# Patient Record
Sex: FEMALE | Race: WHITE | Employment: UNEMPLOYED
[De-identification: names, ages, dates, MRNs, and addresses within clinical notes are randomized per-mention and may not be internally consistent; named-entity substitution may affect disease eponyms.]

---

## 2023-04-14 PROBLEM — F19.10 POLYSUBSTANCE ABUSE (HCC): Status: ACTIVE | Noted: 2023-04-14

## 2023-04-14 PROBLEM — N17.9 AKI (ACUTE KIDNEY INJURY) (HCC): Status: ACTIVE | Noted: 2023-04-14

## 2024-10-11 ENCOUNTER — APPOINTMENT (OUTPATIENT)
Facility: HOSPITAL | Age: 39
End: 2024-10-11
Payer: MEDICAID

## 2024-10-11 ENCOUNTER — HOSPITAL ENCOUNTER (INPATIENT)
Facility: HOSPITAL | Age: 39
LOS: 14 days | Discharge: HOME | End: 2024-10-25
Attending: STUDENT IN AN ORGANIZED HEALTH CARE EDUCATION/TRAINING PROGRAM | Admitting: FAMILY MEDICINE
Payer: MEDICAID

## 2024-10-11 DIAGNOSIS — R10.11 ABDOMINAL PAIN, RIGHT UPPER QUADRANT: Primary | ICD-10-CM

## 2024-10-11 DIAGNOSIS — F10.20 ALCOHOL USE DISORDER, SEVERE, DEPENDENCE (HCC): ICD-10-CM

## 2024-10-11 DIAGNOSIS — R17 JAUNDICE: ICD-10-CM

## 2024-10-11 DIAGNOSIS — F11.90 OPIOID USE DISORDER: ICD-10-CM

## 2024-10-11 DIAGNOSIS — D72.829 LEUKOCYTOSIS, UNSPECIFIED TYPE: ICD-10-CM

## 2024-10-11 DIAGNOSIS — G62.9 NEUROPATHY: ICD-10-CM

## 2024-10-11 LAB
ALBUMIN SERPL-MCNC: 3 G/DL (ref 3.5–5)
ALBUMIN/GLOB SERPL: 0.5 (ref 1.1–2.2)
ALP SERPL-CCNC: 599 U/L (ref 45–117)
ALT SERPL-CCNC: 20 U/L (ref 12–78)
ANION GAP SERPL CALC-SCNC: 10 MMOL/L (ref 2–12)
AST SERPL-CCNC: 113 U/L (ref 15–37)
BASOPHILS # BLD: 0 K/UL (ref 0–0.1)
BASOPHILS NFR BLD: 0 % (ref 0–1)
BILIRUB SERPL-MCNC: 10.5 MG/DL (ref 0.2–1)
BUN SERPL-MCNC: 15 MG/DL (ref 6–20)
BUN/CREAT SERPL: 22 (ref 12–20)
CALCIUM SERPL-MCNC: 9.6 MG/DL (ref 8.5–10.1)
CHLORIDE SERPL-SCNC: 89 MMOL/L (ref 97–108)
CO2 SERPL-SCNC: 29 MMOL/L (ref 21–32)
COMMENT:: NORMAL
CREAT SERPL-MCNC: 0.68 MG/DL (ref 0.55–1.02)
DIFFERENTIAL METHOD BLD: ABNORMAL
EOSINOPHIL # BLD: 0 K/UL (ref 0–0.4)
EOSINOPHIL NFR BLD: 0 % (ref 0–7)
ERYTHROCYTE [DISTWIDTH] IN BLOOD BY AUTOMATED COUNT: 18.1 % (ref 11.5–14.5)
ETHANOL SERPL-MCNC: <10 MG/DL (ref 0–0.08)
GLOBULIN SER CALC-MCNC: 5.6 G/DL (ref 2–4)
GLUCOSE SERPL-MCNC: 103 MG/DL (ref 65–100)
HCT VFR BLD AUTO: 26.8 % (ref 35–47)
HGB BLD-MCNC: 8.8 G/DL (ref 11.5–16)
IMM GRANULOCYTES # BLD AUTO: 0.4 K/UL (ref 0–0.04)
IMM GRANULOCYTES NFR BLD AUTO: 2 % (ref 0–0.5)
INR PPP: 1.2 (ref 0.9–1.1)
LIPASE SERPL-CCNC: 31 U/L (ref 13–75)
LYMPHOCYTES # BLD: 1.5 K/UL (ref 0.8–3.5)
LYMPHOCYTES NFR BLD: 8 % (ref 12–49)
MCH RBC QN AUTO: 40.7 PG (ref 26–34)
MCHC RBC AUTO-ENTMCNC: 32.8 G/DL (ref 30–36.5)
MCV RBC AUTO: 124.1 FL (ref 80–99)
MONOCYTES # BLD: 0.6 K/UL (ref 0–1)
MONOCYTES NFR BLD: 3 % (ref 5–13)
NEUTS SEG # BLD: 16.6 K/UL (ref 1.8–8)
NEUTS SEG NFR BLD: 87 % (ref 32–75)
NRBC # BLD: 0 K/UL (ref 0–0.01)
NRBC BLD-RTO: 0 PER 100 WBC
PLATELET # BLD AUTO: 243 K/UL (ref 150–400)
PMV BLD AUTO: 9.5 FL (ref 8.9–12.9)
POTASSIUM SERPL-SCNC: 3.1 MMOL/L (ref 3.5–5.1)
PROT SERPL-MCNC: 8.6 G/DL (ref 6.4–8.2)
PROTHROMBIN TIME: 12.4 SEC (ref 9–11.1)
RBC # BLD AUTO: 2.16 M/UL (ref 3.8–5.2)
RBC MORPH BLD: ABNORMAL
RBC MORPH BLD: ABNORMAL
SODIUM SERPL-SCNC: 128 MMOL/L (ref 136–145)
SPECIMEN HOLD: NORMAL
WBC # BLD AUTO: 19.1 K/UL (ref 3.6–11)

## 2024-10-11 PROCEDURE — 85025 COMPLETE CBC W/AUTO DIFF WBC: CPT

## 2024-10-11 PROCEDURE — 76705 ECHO EXAM OF ABDOMEN: CPT

## 2024-10-11 PROCEDURE — A9579 GAD-BASE MR CONTRAST NOS,1ML: HCPCS | Performed by: PHYSICIAN ASSISTANT

## 2024-10-11 PROCEDURE — 2709999900 MRI ABDOMEN W WO CONTRAST MRCP

## 2024-10-11 PROCEDURE — 80053 COMPREHEN METABOLIC PANEL: CPT

## 2024-10-11 PROCEDURE — 85610 PROTHROMBIN TIME: CPT

## 2024-10-11 PROCEDURE — 1100000000 HC RM PRIVATE

## 2024-10-11 PROCEDURE — 99285 EMERGENCY DEPT VISIT HI MDM: CPT

## 2024-10-11 PROCEDURE — 2580000003 HC RX 258: Performed by: PHYSICIAN ASSISTANT

## 2024-10-11 PROCEDURE — 96374 THER/PROPH/DIAG INJ IV PUSH: CPT

## 2024-10-11 PROCEDURE — 36415 COLL VENOUS BLD VENIPUNCTURE: CPT

## 2024-10-11 PROCEDURE — 6360000004 HC RX CONTRAST MEDICATION: Performed by: PHYSICIAN ASSISTANT

## 2024-10-11 PROCEDURE — 74183 MRI ABD W/O CNTR FLWD CNTR: CPT

## 2024-10-11 PROCEDURE — 6360000002 HC RX W HCPCS: Performed by: PHYSICIAN ASSISTANT

## 2024-10-11 PROCEDURE — 82077 ASSAY SPEC XCP UR&BREATH IA: CPT

## 2024-10-11 PROCEDURE — 6360000002 HC RX W HCPCS: Performed by: STUDENT IN AN ORGANIZED HEALTH CARE EDUCATION/TRAINING PROGRAM

## 2024-10-11 PROCEDURE — 2580000003 HC RX 258: Performed by: FAMILY MEDICINE

## 2024-10-11 PROCEDURE — 83690 ASSAY OF LIPASE: CPT

## 2024-10-11 PROCEDURE — 96375 TX/PRO/DX INJ NEW DRUG ADDON: CPT

## 2024-10-11 PROCEDURE — 96376 TX/PRO/DX INJ SAME DRUG ADON: CPT

## 2024-10-11 RX ORDER — ONDANSETRON 2 MG/ML
4 INJECTION INTRAMUSCULAR; INTRAVENOUS EVERY 6 HOURS PRN
Status: DISCONTINUED | OUTPATIENT
Start: 2024-10-11 | End: 2024-10-25 | Stop reason: HOSPADM

## 2024-10-11 RX ORDER — ONDANSETRON 2 MG/ML
4 INJECTION INTRAMUSCULAR; INTRAVENOUS ONCE
Status: COMPLETED | OUTPATIENT
Start: 2024-10-11 | End: 2024-10-11

## 2024-10-11 RX ORDER — SODIUM CHLORIDE 9 MG/ML
INJECTION, SOLUTION INTRAVENOUS CONTINUOUS
OUTPATIENT
Start: 2024-10-11

## 2024-10-11 RX ORDER — ACETAMINOPHEN 325 MG/1
650 TABLET ORAL EVERY 6 HOURS PRN
Status: DISCONTINUED | OUTPATIENT
Start: 2024-10-11 | End: 2024-10-25 | Stop reason: HOSPADM

## 2024-10-11 RX ORDER — MAGNESIUM SULFATE IN WATER 40 MG/ML
2000 INJECTION, SOLUTION INTRAVENOUS PRN
Status: DISCONTINUED | OUTPATIENT
Start: 2024-10-11 | End: 2024-10-22

## 2024-10-11 RX ORDER — ONDANSETRON 4 MG/1
4 TABLET, ORALLY DISINTEGRATING ORAL EVERY 8 HOURS PRN
Status: DISCONTINUED | OUTPATIENT
Start: 2024-10-11 | End: 2024-10-25 | Stop reason: HOSPADM

## 2024-10-11 RX ORDER — SODIUM CHLORIDE 0.9 % (FLUSH) 0.9 %
5-40 SYRINGE (ML) INJECTION 2 TIMES DAILY
Status: DISCONTINUED | OUTPATIENT
Start: 2024-10-11 | End: 2024-10-25 | Stop reason: HOSPADM

## 2024-10-11 RX ORDER — KETOROLAC TROMETHAMINE 30 MG/ML
15 INJECTION, SOLUTION INTRAMUSCULAR; INTRAVENOUS ONCE
Status: COMPLETED | OUTPATIENT
Start: 2024-10-11 | End: 2024-10-11

## 2024-10-11 RX ORDER — SODIUM CHLORIDE 9 MG/ML
INJECTION, SOLUTION INTRAVENOUS PRN
Status: DISCONTINUED | OUTPATIENT
Start: 2024-10-11 | End: 2024-10-25 | Stop reason: HOSPADM

## 2024-10-11 RX ORDER — MORPHINE SULFATE 4 MG/ML
4 INJECTION, SOLUTION INTRAMUSCULAR; INTRAVENOUS
Status: COMPLETED | OUTPATIENT
Start: 2024-10-11 | End: 2024-10-11

## 2024-10-11 RX ORDER — POLYETHYLENE GLYCOL 3350 17 G/17G
17 POWDER, FOR SOLUTION ORAL DAILY PRN
Status: DISCONTINUED | OUTPATIENT
Start: 2024-10-11 | End: 2024-10-25 | Stop reason: HOSPADM

## 2024-10-11 RX ORDER — POTASSIUM CHLORIDE 750 MG/1
40 TABLET, EXTENDED RELEASE ORAL PRN
Status: DISCONTINUED | OUTPATIENT
Start: 2024-10-11 | End: 2024-10-12

## 2024-10-11 RX ORDER — POTASSIUM CHLORIDE 7.45 MG/ML
10 INJECTION INTRAVENOUS PRN
Status: DISCONTINUED | OUTPATIENT
Start: 2024-10-11 | End: 2024-10-12

## 2024-10-11 RX ORDER — MORPHINE SULFATE 2 MG/ML
2 INJECTION, SOLUTION INTRAMUSCULAR; INTRAVENOUS
Status: COMPLETED | OUTPATIENT
Start: 2024-10-11 | End: 2024-10-11

## 2024-10-11 RX ORDER — SODIUM CHLORIDE 0.9 % (FLUSH) 0.9 %
5-40 SYRINGE (ML) INJECTION EVERY 12 HOURS SCHEDULED
Status: DISCONTINUED | OUTPATIENT
Start: 2024-10-11 | End: 2024-10-25 | Stop reason: HOSPADM

## 2024-10-11 RX ORDER — ACETAMINOPHEN 650 MG/1
650 SUPPOSITORY RECTAL EVERY 6 HOURS PRN
Status: DISCONTINUED | OUTPATIENT
Start: 2024-10-11 | End: 2024-10-25 | Stop reason: HOSPADM

## 2024-10-11 RX ORDER — SODIUM CHLORIDE 0.9 % (FLUSH) 0.9 %
5-40 SYRINGE (ML) INJECTION PRN
Status: DISCONTINUED | OUTPATIENT
Start: 2024-10-11 | End: 2024-10-12

## 2024-10-11 RX ADMIN — Medication 5 ML: at 21:24

## 2024-10-11 RX ADMIN — ONDANSETRON 4 MG: 2 INJECTION INTRAMUSCULAR; INTRAVENOUS at 16:58

## 2024-10-11 RX ADMIN — KETOROLAC TROMETHAMINE 15 MG: 30 INJECTION, SOLUTION INTRAMUSCULAR at 16:58

## 2024-10-11 RX ADMIN — MORPHINE SULFATE 2 MG: 2 INJECTION, SOLUTION INTRAMUSCULAR; INTRAVENOUS at 18:12

## 2024-10-11 RX ADMIN — SODIUM CHLORIDE, PRESERVATIVE FREE 10 ML: 5 INJECTION INTRAVENOUS at 20:54

## 2024-10-11 RX ADMIN — ONDANSETRON 4 MG: 2 INJECTION INTRAMUSCULAR; INTRAVENOUS at 18:23

## 2024-10-11 RX ADMIN — SODIUM CHLORIDE, PRESERVATIVE FREE 10 ML: 5 INJECTION INTRAVENOUS at 22:11

## 2024-10-11 RX ADMIN — GADOTERIDOL 9 ML: 279.3 INJECTION, SOLUTION INTRAVENOUS at 22:11

## 2024-10-11 RX ADMIN — MORPHINE SULFATE 4 MG: 4 INJECTION, SOLUTION INTRAMUSCULAR; INTRAVENOUS at 20:50

## 2024-10-11 ASSESSMENT — PAIN DESCRIPTION - ORIENTATION
ORIENTATION: LEFT
ORIENTATION: RIGHT;LEFT;MID
ORIENTATION: MID
ORIENTATION: RIGHT;LEFT;MID
ORIENTATION: RIGHT;UPPER

## 2024-10-11 ASSESSMENT — PAIN DESCRIPTION - LOCATION
LOCATION: ABDOMEN

## 2024-10-11 ASSESSMENT — PAIN DESCRIPTION - ONSET
ONSET: ON-GOING
ONSET: ON-GOING

## 2024-10-11 ASSESSMENT — PAIN - FUNCTIONAL ASSESSMENT
PAIN_FUNCTIONAL_ASSESSMENT: PREVENTS OR INTERFERES WITH MANY ACTIVE NOT PASSIVE ACTIVITIES
PAIN_FUNCTIONAL_ASSESSMENT: ACTIVITIES ARE NOT PREVENTED
PAIN_FUNCTIONAL_ASSESSMENT: PREVENTS OR INTERFERES SOME ACTIVE ACTIVITIES AND ADLS
PAIN_FUNCTIONAL_ASSESSMENT: ACTIVITIES ARE NOT PREVENTED
PAIN_FUNCTIONAL_ASSESSMENT: 0-10

## 2024-10-11 ASSESSMENT — PAIN DESCRIPTION - PAIN TYPE
TYPE: ACUTE PAIN
TYPE: ACUTE PAIN

## 2024-10-11 ASSESSMENT — PAIN DESCRIPTION - FREQUENCY
FREQUENCY: CONTINUOUS
FREQUENCY: CONTINUOUS

## 2024-10-11 ASSESSMENT — PAIN SCALES - GENERAL
PAINLEVEL_OUTOF10: 10
PAINLEVEL_OUTOF10: 9
PAINLEVEL_OUTOF10: 10

## 2024-10-11 ASSESSMENT — PAIN DESCRIPTION - DESCRIPTORS
DESCRIPTORS: ACHING
DESCRIPTORS: SHARP;DISCOMFORT;SHOOTING
DESCRIPTORS: ACHING;TENDER
DESCRIPTORS: SHARP;DISCOMFORT;CRAMPING

## 2024-10-11 NOTE — ED TRIAGE NOTES
Pt ambulatory to ED w/ c/o RUQ abd pain, n/v, abd distention, decreased PO intake and jaundice. Family states they noticed yellowing of skin today. Pt denies hx of liver problems.     Pt daily drinker, 2-3 glasses of vodka daily.

## 2024-10-11 NOTE — H&P
History and Physical    Date of Service:  10/11/2024  Primary Care Provider: No primary care provider on file.  Source of information: patient, electronic medical record    Chief Complaint: Abdominal Pain      History of Presenting Illness:   Kristie Rowan is a  39 y.o. female with apmhx past heroin abuse, and etoh dependence who presents with abdominal pain, n/v, and jaundice.  She was in her usual state of health until 1.5 weeks ago when she developed nausea, vomiting, abdominal pain and bloating.  She denies fever, chills, diarrhea, or dysuria.  She has a remote hx of IV heroin use, and has been sober for 4 years.  She states she tested negative for hep C 4 years ago.  She does drink 3 glasses of vodka or wine daily.  She denies hx withdrawal.     In the ED, she was tachycardic to 118.  Other VSS.  Lab showed WBC 19.1, macrocytic anemia with Hgb 8.8, Na 128, + 3.1, , Alk phos 599, T. Bili 10.5, and albumin 3.  US abdomen showed hepatomegaly with increased liver echogenicity and ascites, and gallbladder sludge with wall thickening an pericholecystic fluid.  MRCP was ordered, and is pending.    In the ED, she received morphine, toradol, and zofran       REVIEW OF SYSTEMS:  A comprehensive review of systems was negative except for that written in the History of Present Illness.     Pmhx; past heroin abuse on suboxone, etoh dependence  No past surgical history on file.  Prior to Admission medications    Not on File     No Known Allergies   No family history on file.   Social History:     Social Determinants of Health     Tobacco Use: Not on file (6/30/2018)   Alcohol Use: Not on file   Financial Resource Strain: Not on file   Food Insecurity: Not on file   Transportation Needs: Not on file   Physical Activity: Not on file   Stress: Not on file   Social Connections: Not on file   Intimate Partner Violence: Not on file   Depression: Not on file   Housing Stability: Not on file   Interpersonal Safety:

## 2024-10-11 NOTE — ED PROVIDER NOTES
[facility] EMERGENCY DEP  EMERGENCY DEPARTMENT ENCOUNTER      Pt Name: Kristie Rowan  MRN: [id number]  Birthdate 1985  Date of evaluation: 10/11/2024  Provider: KELLEY Mistry    CHIEF COMPLAINT       Chief Complaint   Patient presents with   • Abdominal Pain         HISTORY OF PRESENT ILLNESS   (Location/Symptom, Timing/Onset, Context/Setting, Quality, Duration, Modifying Factors, Severity)  Note limiting factors.   38 y/o female presenting with complaint of abdominal pain and jaundice.  The patient reports right-sided abdominal pain for the past week, with onset of jaundice today.  Associated symptoms include nausea and vomiting, last episode just prior to arrival.  She also reports decreased appetite, cough, rhinorrhea, abdominal distention, constipation and lightheadedness.  Social history significant for daily alcohol use.    The history is provided by the patient.         Review of External Medical Records:     Nursing Notes were reviewed.    REVIEW OF SYSTEMS    (2-9 systems for level 4, 10 or more for level 5)     Review of Systems   Constitutional:  Positive for appetite change. Negative for chills and fever.   HENT:  Positive for rhinorrhea. Negative for congestion.    Respiratory:  Positive for cough.    Gastrointestinal:  Positive for abdominal distention, abdominal pain, constipation, nausea and vomiting. Negative for diarrhea.   Genitourinary:  Negative for dysuria, frequency and urgency.   Neurological:  Positive for light-headedness. Negative for syncope.       Except as noted above the remainder of the review of systems was reviewed and negative.       PAST MEDICAL HISTORY   No past medical history on file.      SURGICAL HISTORY     No past surgical history on file.      CURRENT MEDICATIONS       Previous Medications    No medications on file       ALLERGIES     Patient has no known allergies.    FAMILY HISTORY     No family history on file.       SOCIAL HISTORY       Social History      Socioeconomic History   • Marital status:            PHYSICAL EXAM    (up to 7 for level 4, 8 or more for level 5)     ED Triage Vitals   BP Systolic BP Percentile Diastolic BP Percentile Temp Temp src Pulse Resp SpO2   -- -- -- -- -- -- -- --      Height Weight         -- --             Body mass index is 16.8 kg/m².    Physical Exam  Vitals and nursing note reviewed.   Constitutional:       General: She is not in acute distress.     Appearance: She is not diaphoretic.   HENT:      Head: Normocephalic.   Eyes:      Extraocular Movements: Extraocular movements intact.      Conjunctiva/sclera: Conjunctivae normal.   Cardiovascular:      Rate and Rhythm: Regular rhythm. Tachycardia present.      Heart sounds: Normal heart sounds.   Pulmonary:      Effort: Pulmonary effort is normal.      Breath sounds: Normal breath sounds.   Abdominal:      General: Bowel sounds are normal. There is no distension.      Palpations: Abdomen is soft.      Tenderness: There is abdominal tenderness (generalized, most pronounced in epigastric/RUQ). There is no guarding or rebound.   Skin:     General: Skin is warm and dry.      Coloration: Skin is jaundiced.   Neurological:      Mental Status: She is alert.   Psychiatric:         Mood and Affect: Mood normal.         Behavior: Behavior normal.         DIAGNOSTIC RESULTS     EKG: All EKG's are interpreted by the Emergency Department Physician who either signs or Co-signs this chart in the absence of a cardiologist.        RADIOLOGY:   Non-plain film images such as CT, Ultrasound and MRI are read by the radiologist. Plain radiographic images are visualized and preliminarily interpreted by the emergency physician with the below findings:        Interpretation per the Radiologist below, if available at the time of this note:    US ABDOMEN LIMITED Specify organ? GALLBLADDER, PANCREAS   Final Result      Hepatomegaly with increased liver echogenicity and ascites. Cannot rule out

## 2024-10-12 ENCOUNTER — APPOINTMENT (OUTPATIENT)
Facility: HOSPITAL | Age: 39
End: 2024-10-12
Payer: MEDICAID

## 2024-10-12 PROBLEM — E87.1 HYPONATREMIA: Status: ACTIVE | Noted: 2024-10-12

## 2024-10-12 PROBLEM — F10.20 ALCOHOL USE DISORDER, MODERATE, DEPENDENCE (HCC): Status: ACTIVE | Noted: 2024-10-12

## 2024-10-12 PROBLEM — K70.11 ALCOHOLIC HEPATITIS WITH ASCITES: Status: ACTIVE | Noted: 2024-10-12

## 2024-10-12 PROBLEM — K70.11 ASCITES DUE TO ALCOHOLIC HEPATITIS: Status: ACTIVE | Noted: 2024-10-12

## 2024-10-12 LAB
-: ABNORMAL
ALBUMIN SERPL-MCNC: 2 G/DL (ref 3.5–5)
ALBUMIN/GLOB SERPL: 0.6 (ref 1.1–2.2)
ALP SERPL-CCNC: 392 U/L (ref 45–117)
ALT SERPL-CCNC: 16 U/L (ref 12–78)
AMPHET UR QL SCN: NEGATIVE
ANION GAP SERPL CALC-SCNC: 10 MMOL/L (ref 2–12)
ANION GAP SERPL CALC-SCNC: 6 MMOL/L (ref 2–12)
AST SERPL-CCNC: 87 U/L (ref 15–37)
BARBITURATES UR QL SCN: NEGATIVE
BENZODIAZ UR QL: NEGATIVE
BILIRUB DIRECT SERPL-MCNC: 6.4 MG/DL (ref 0–0.2)
BILIRUB INDIRECT SERPL-MCNC: 0.7 MG/DL (ref 0–1.1)
BILIRUB SERPL-MCNC: 6.9 MG/DL (ref 0.2–1)
BILIRUB SERPL-MCNC: 7.1 MG/DL (ref 0.2–1)
BUN SERPL-MCNC: 13 MG/DL (ref 6–20)
BUN SERPL-MCNC: 13 MG/DL (ref 6–20)
BUN/CREAT SERPL: 18 (ref 12–20)
BUN/CREAT SERPL: 19 (ref 12–20)
CALCIUM SERPL-MCNC: 7.9 MG/DL (ref 8.5–10.1)
CALCIUM SERPL-MCNC: 8.2 MG/DL (ref 8.5–10.1)
CANNABINOIDS UR QL SCN: NEGATIVE
CHLORIDE SERPL-SCNC: 95 MMOL/L (ref 97–108)
CHLORIDE SERPL-SCNC: 95 MMOL/L (ref 97–108)
CO2 SERPL-SCNC: 29 MMOL/L (ref 21–32)
CO2 SERPL-SCNC: 31 MMOL/L (ref 21–32)
COCAINE UR QL SCN: NEGATIVE
COMMENT:: NORMAL
CREAT SERPL-MCNC: 0.68 MG/DL (ref 0.55–1.02)
CREAT SERPL-MCNC: 0.74 MG/DL (ref 0.55–1.02)
FOLATE SERPL-MCNC: 2.1 NG/ML (ref 5–21)
GLOBULIN SER CALC-MCNC: 3.6 G/DL (ref 2–4)
GLUCOSE SERPL-MCNC: 81 MG/DL (ref 65–100)
GLUCOSE SERPL-MCNC: 92 MG/DL (ref 65–100)
HAV IGM SER QL: NONREACTIVE
HBV CORE IGM SER QL: NONREACTIVE
HBV SURFACE AG SER QL: 0.11 INDEX
HBV SURFACE AG SER QL: NEGATIVE
HCV AB SER IA-ACNC: 9.07 INDEX
HCV AB SERPL QL IA: REACTIVE
Lab: ABNORMAL
METHADONE UR QL: NEGATIVE
OPIATES UR QL: POSITIVE
OSMOLALITY SERPL: 281 MOSM/KG H2O
PCP UR QL: NEGATIVE
POTASSIUM SERPL-SCNC: 3 MMOL/L (ref 3.5–5.1)
POTASSIUM SERPL-SCNC: 3.1 MMOL/L (ref 3.5–5.1)
PROT SERPL-MCNC: 5.6 G/DL (ref 6.4–8.2)
SODIUM SERPL-SCNC: 132 MMOL/L (ref 136–145)
SODIUM SERPL-SCNC: 134 MMOL/L (ref 136–145)
SPECIMEN HOLD: NORMAL
VIT B12 SERPL-MCNC: 436 PG/ML (ref 193–986)

## 2024-10-12 PROCEDURE — 82607 VITAMIN B-12: CPT

## 2024-10-12 PROCEDURE — 82248 BILIRUBIN DIRECT: CPT

## 2024-10-12 PROCEDURE — 83930 ASSAY OF BLOOD OSMOLALITY: CPT

## 2024-10-12 PROCEDURE — 1100000000 HC RM PRIVATE

## 2024-10-12 PROCEDURE — 6370000000 HC RX 637 (ALT 250 FOR IP): Performed by: FAMILY MEDICINE

## 2024-10-12 PROCEDURE — 80053 COMPREHEN METABOLIC PANEL: CPT

## 2024-10-12 PROCEDURE — 82247 BILIRUBIN TOTAL: CPT

## 2024-10-12 PROCEDURE — 6360000002 HC RX W HCPCS: Performed by: NURSE PRACTITIONER

## 2024-10-12 PROCEDURE — 2500000003 HC RX 250 WO HCPCS: Performed by: FAMILY MEDICINE

## 2024-10-12 PROCEDURE — 87040 BLOOD CULTURE FOR BACTERIA: CPT

## 2024-10-12 PROCEDURE — 6360000002 HC RX W HCPCS: Performed by: FAMILY MEDICINE

## 2024-10-12 PROCEDURE — 80307 DRUG TEST PRSMV CHEM ANLYZR: CPT

## 2024-10-12 PROCEDURE — 76705 ECHO EXAM OF ABDOMEN: CPT

## 2024-10-12 PROCEDURE — 99222 1ST HOSP IP/OBS MODERATE 55: CPT | Performed by: INTERNAL MEDICINE

## 2024-10-12 PROCEDURE — 82746 ASSAY OF FOLIC ACID SERUM: CPT

## 2024-10-12 PROCEDURE — 36415 COLL VENOUS BLD VENIPUNCTURE: CPT

## 2024-10-12 PROCEDURE — 6370000000 HC RX 637 (ALT 250 FOR IP): Performed by: NURSE PRACTITIONER

## 2024-10-12 PROCEDURE — 2580000003 HC RX 258: Performed by: FAMILY MEDICINE

## 2024-10-12 PROCEDURE — 80074 ACUTE HEPATITIS PANEL: CPT

## 2024-10-12 RX ORDER — LANOLIN ALCOHOL/MO/W.PET/CERES
100 CREAM (GRAM) TOPICAL DAILY
Status: DISCONTINUED | OUTPATIENT
Start: 2024-10-12 | End: 2024-10-25 | Stop reason: HOSPADM

## 2024-10-12 RX ORDER — LORAZEPAM 2 MG/ML
3 INJECTION INTRAMUSCULAR
Status: DISCONTINUED | OUTPATIENT
Start: 2024-10-12 | End: 2024-10-22

## 2024-10-12 RX ORDER — LORAZEPAM 1 MG/1
4 TABLET ORAL
Status: DISCONTINUED | OUTPATIENT
Start: 2024-10-12 | End: 2024-10-22

## 2024-10-12 RX ORDER — SODIUM CHLORIDE 0.9 % (FLUSH) 0.9 %
5-40 SYRINGE (ML) INJECTION PRN
Status: DISCONTINUED | OUTPATIENT
Start: 2024-10-12 | End: 2024-10-25 | Stop reason: HOSPADM

## 2024-10-12 RX ORDER — MORPHINE SULFATE 2 MG/ML
2 INJECTION, SOLUTION INTRAMUSCULAR; INTRAVENOUS EVERY 4 HOURS PRN
Status: DISCONTINUED | OUTPATIENT
Start: 2024-10-12 | End: 2024-10-25

## 2024-10-12 RX ORDER — SODIUM CHLORIDE 9 MG/ML
INJECTION, SOLUTION INTRAVENOUS PRN
Status: DISCONTINUED | OUTPATIENT
Start: 2024-10-12 | End: 2024-10-25 | Stop reason: HOSPADM

## 2024-10-12 RX ORDER — LORAZEPAM 2 MG/ML
4 INJECTION INTRAMUSCULAR
Status: DISCONTINUED | OUTPATIENT
Start: 2024-10-12 | End: 2024-10-22

## 2024-10-12 RX ORDER — LORAZEPAM 2 MG/ML
1 INJECTION INTRAMUSCULAR
Status: DISCONTINUED | OUTPATIENT
Start: 2024-10-12 | End: 2024-10-22

## 2024-10-12 RX ORDER — MORPHINE SULFATE 4 MG/ML
4 INJECTION, SOLUTION INTRAMUSCULAR; INTRAVENOUS ONCE
Status: COMPLETED | OUTPATIENT
Start: 2024-10-12 | End: 2024-10-12

## 2024-10-12 RX ORDER — SODIUM CHLORIDE 0.9 % (FLUSH) 0.9 %
5-40 SYRINGE (ML) INJECTION EVERY 12 HOURS SCHEDULED
Status: DISCONTINUED | OUTPATIENT
Start: 2024-10-12 | End: 2024-10-25 | Stop reason: HOSPADM

## 2024-10-12 RX ORDER — DEXTROSE MONOHYDRATE, SODIUM CHLORIDE, AND POTASSIUM CHLORIDE 50; 1.49; 9 G/1000ML; G/1000ML; G/1000ML
INJECTION, SOLUTION INTRAVENOUS CONTINUOUS
Status: DISCONTINUED | OUTPATIENT
Start: 2024-10-12 | End: 2024-10-14

## 2024-10-12 RX ORDER — POTASSIUM CHLORIDE 7.45 MG/ML
10 INJECTION INTRAVENOUS
Status: DISCONTINUED | OUTPATIENT
Start: 2024-10-12 | End: 2024-10-12

## 2024-10-12 RX ORDER — LORAZEPAM 1 MG/1
2 TABLET ORAL
Status: DISCONTINUED | OUTPATIENT
Start: 2024-10-12 | End: 2024-10-22

## 2024-10-12 RX ORDER — POTASSIUM CHLORIDE 750 MG/1
40 TABLET, EXTENDED RELEASE ORAL ONCE
Status: COMPLETED | OUTPATIENT
Start: 2024-10-12 | End: 2024-10-12

## 2024-10-12 RX ORDER — LORAZEPAM 1 MG/1
3 TABLET ORAL
Status: DISCONTINUED | OUTPATIENT
Start: 2024-10-12 | End: 2024-10-22

## 2024-10-12 RX ORDER — NALOXONE HYDROCHLORIDE 0.4 MG/ML
0.4 INJECTION, SOLUTION INTRAMUSCULAR; INTRAVENOUS; SUBCUTANEOUS PRN
Status: DISCONTINUED | OUTPATIENT
Start: 2024-10-12 | End: 2024-10-25 | Stop reason: HOSPADM

## 2024-10-12 RX ORDER — LORAZEPAM 2 MG/ML
2 INJECTION INTRAMUSCULAR
Status: DISCONTINUED | OUTPATIENT
Start: 2024-10-12 | End: 2024-10-22

## 2024-10-12 RX ORDER — LANOLIN ALCOHOL/MO/W.PET/CERES
1 CREAM (GRAM) TOPICAL DAILY
Status: DISCONTINUED | OUTPATIENT
Start: 2024-10-12 | End: 2024-10-25 | Stop reason: HOSPADM

## 2024-10-12 RX ORDER — LORAZEPAM 1 MG/1
1 TABLET ORAL
Status: DISCONTINUED | OUTPATIENT
Start: 2024-10-12 | End: 2024-10-22

## 2024-10-12 RX ORDER — MULTIVITAMIN WITH IRON
1 TABLET ORAL DAILY
Status: DISCONTINUED | OUTPATIENT
Start: 2024-10-12 | End: 2024-10-25 | Stop reason: HOSPADM

## 2024-10-12 RX ADMIN — PIPERACILLIN AND TAZOBACTAM 3375 MG: 3; .375 INJECTION, POWDER, LYOPHILIZED, FOR SOLUTION INTRAVENOUS at 07:44

## 2024-10-12 RX ADMIN — MORPHINE SULFATE 2 MG: 2 INJECTION, SOLUTION INTRAMUSCULAR; INTRAVENOUS at 10:58

## 2024-10-12 RX ADMIN — MORPHINE SULFATE 4 MG: 4 INJECTION, SOLUTION INTRAMUSCULAR; INTRAVENOUS at 00:44

## 2024-10-12 RX ADMIN — MORPHINE SULFATE 2 MG: 2 INJECTION, SOLUTION INTRAMUSCULAR; INTRAVENOUS at 15:49

## 2024-10-12 RX ADMIN — THERA TABS 1 TABLET: TAB at 09:08

## 2024-10-12 RX ADMIN — POTASSIUM CHLORIDE, DEXTROSE MONOHYDRATE AND SODIUM CHLORIDE: 150; 5; 900 INJECTION, SOLUTION INTRAVENOUS at 01:39

## 2024-10-12 RX ADMIN — PIPERACILLIN AND TAZOBACTAM 3375 MG: 3; .375 INJECTION, POWDER, LYOPHILIZED, FOR SOLUTION INTRAVENOUS at 22:59

## 2024-10-12 RX ADMIN — ONDANSETRON 4 MG: 2 INJECTION INTRAMUSCULAR; INTRAVENOUS at 07:11

## 2024-10-12 RX ADMIN — MORPHINE SULFATE 2 MG: 2 INJECTION, SOLUTION INTRAMUSCULAR; INTRAVENOUS at 22:53

## 2024-10-12 RX ADMIN — FOLIC ACID TAB 400 MCG 1 MG: 400 TAB at 09:09

## 2024-10-12 RX ADMIN — PIPERACILLIN AND TAZOBACTAM 4500 MG: 4; .5 INJECTION, POWDER, LYOPHILIZED, FOR SOLUTION INTRAVENOUS at 01:04

## 2024-10-12 RX ADMIN — PIPERACILLIN AND TAZOBACTAM 3375 MG: 3; .375 INJECTION, POWDER, LYOPHILIZED, FOR SOLUTION INTRAVENOUS at 14:19

## 2024-10-12 RX ADMIN — Medication 100 MG: at 09:08

## 2024-10-12 RX ADMIN — ONDANSETRON 4 MG: 2 INJECTION INTRAMUSCULAR; INTRAVENOUS at 16:14

## 2024-10-12 RX ADMIN — POTASSIUM CHLORIDE 40 MEQ: 750 TABLET, EXTENDED RELEASE ORAL at 10:40

## 2024-10-12 RX ADMIN — ONDANSETRON 4 MG: 2 INJECTION INTRAMUSCULAR; INTRAVENOUS at 00:45

## 2024-10-12 RX ADMIN — POTASSIUM CHLORIDE 40 MEQ: 750 TABLET, EXTENDED RELEASE ORAL at 09:08

## 2024-10-12 ASSESSMENT — PAIN DESCRIPTION - ONSET: ONSET: ON-GOING

## 2024-10-12 ASSESSMENT — PAIN DESCRIPTION - LOCATION
LOCATION: ABDOMEN

## 2024-10-12 ASSESSMENT — PAIN SCALES - GENERAL
PAINLEVEL_OUTOF10: 6
PAINLEVEL_OUTOF10: 6
PAINLEVEL_OUTOF10: 9
PAINLEVEL_OUTOF10: 7
PAINLEVEL_OUTOF10: 9
PAINLEVEL_OUTOF10: 6
PAINLEVEL_OUTOF10: 8
PAINLEVEL_OUTOF10: 5

## 2024-10-12 ASSESSMENT — PAIN DESCRIPTION - ORIENTATION
ORIENTATION: RIGHT
ORIENTATION: RIGHT

## 2024-10-12 ASSESSMENT — PAIN SCALES - WONG BAKER
WONGBAKER_NUMERICALRESPONSE: NO HURT
WONGBAKER_NUMERICALRESPONSE: NO HURT

## 2024-10-12 ASSESSMENT — PAIN DESCRIPTION - DESCRIPTORS
DESCRIPTORS: ACHING

## 2024-10-12 ASSESSMENT — PAIN DESCRIPTION - FREQUENCY: FREQUENCY: CONTINUOUS

## 2024-10-12 NOTE — PROGRESS NOTES
Department of General Surgery - Adult  Surgical Service   Attending Progress Note      SUBJECTIVE: Still complaining of upper abdominal pain, nausea, occasional dry heaves.  Nonetheless, she has been sipping liquids.  She denies fever or chills.    OBJECTIVE      Physical    VITALS:  BP 95/64   Pulse 90   Temp 98.4 °F (36.9 °C) (Oral)   Resp 16   Ht 1.651 m (5' 5\")   Wt 45.8 kg (100 lb 15.5 oz)   SpO2 99%   BMI 16.80 kg/m²   INTAKE/OUTPUT:    Intake/Output Summary (Last 24 hours) at 10/12/2024 0846  Last data filed at 10/11/2024 2124  Gross per 24 hour   Intake 15 ml   Output --   Net 15 ml     ABDOMEN: Slightly distended, soft; upper abdominal pain with palpation.  No mass or guarding.  No hernia.    Data  CBC with Differential:    Lab Results   Component Value Date/Time    WBC 19.1 10/11/2024 04:18 PM    RBC 2.16 10/11/2024 04:18 PM    HGB 8.8 10/11/2024 04:18 PM    HCT 26.8 10/11/2024 04:18 PM     10/11/2024 04:18 PM    .1 10/11/2024 04:18 PM    MCH 40.7 10/11/2024 04:18 PM    MCHC 32.8 10/11/2024 04:18 PM    RDW 18.1 10/11/2024 04:18 PM    NRBC 0.0 10/11/2024 04:18 PM    NRBC 0.00 10/11/2024 04:18 PM    LYMPHOPCT 8 10/11/2024 04:18 PM    MONOPCT 3 10/11/2024 04:18 PM    EOSPCT 0 10/11/2024 04:18 PM    BASOPCT 0 10/11/2024 04:18 PM    MONOSABS 0.6 10/11/2024 04:18 PM    LYMPHSABS 1.5 10/11/2024 04:18 PM    EOSABS 0.0 10/11/2024 04:18 PM    BASOSABS 0.0 10/11/2024 04:18 PM    DIFFTYPE SMEAR SCANNED 10/11/2024 04:18 PM     CMP:    Lab Results   Component Value Date/Time     10/12/2024 06:24 AM    K 3.1 10/12/2024 06:24 AM    CL 95 10/12/2024 06:24 AM    CO2 29 10/12/2024 06:24 AM    BUN 13 10/12/2024 06:24 AM    CREATININE 0.74 10/12/2024 06:24 AM    LABGLOM >90 10/12/2024 06:24 AM    GLUCOSE 81 10/12/2024 06:24 AM    CALCIUM 7.9 10/12/2024 06:24 AM    BILITOT 6.9 10/12/2024 06:24 AM    BILITOT 7.1 10/12/2024 06:24 AM    ALKPHOS 392 10/12/2024 06:24 AM    AST 87 10/12/2024 06:24 AM    ALT

## 2024-10-12 NOTE — CARE COORDINATION
Care Management Initial Assessment       RUR: 15%  Readmission? No  1st IM letter given? No  1st  letter given: No     10/12/24 0844   Service Assessment   Patient Orientation Alert and Oriented   Cognition Alert   History Provided By Patient   Primary Caregiver Self  (lives with family)   Support Systems   (extended family)   Patient's Healthcare Decision Maker is: Legal Next of Kin   PCP Verified by CM No  (doesnot have PCP, looking for PCP)   Prior Functional Level Independent in ADLs/IADLs   Current Functional Level Independent in ADLs/IADLs   Can patient return to prior living arrangement Yes  (but there is a consult to review the option of rehab)   Ability to make needs known: Good   Family able to assist with home care needs: Yes   Would you like for me to discuss the discharge plan with any other family members/significant others, and if so, who? No   Social/Functional History   Type of Home House  (two story home)     This CM met with patient and explained CM role in support of dc planning.  Patient explained that she is not working at this point.  Patient gets medications at [facility] on [address] near [facility].      Patient indicated that she will not go to Rehab.  This was discussed with patient.  Patient does not have a history of ETOH rehab. Support was offered.  CM to follow.  Patient does not use DME in the home.      SHANNAN CORREA  8:49 AM

## 2024-10-12 NOTE — CONSULTS
[facility] Consultation for: Possible cholecystitis elevated LFTs    History of Present Illness:      Kristie Rowan is a 39 y.o. female who has been having some issues with abdominal pain periodically over the last few months.  It got a little bit worse today.  Pain is in the upper abdomen across the right and left side and also in the mid abdomen as well.  She was noticed to be jaundiced recently and went to the emergency room because of that.  She says she has been having some nausea and has not eaten in the last week.  She also drinks about 4 cocktails or more a day or a number of glasses of wine.    Past medical history -alcoholism    Past surgical history-none      Current Facility-Administered Medications:     sodium chloride flush 0.9 % injection 5-40 mL, 5-40 mL, IntraVENous, 2 times per day, Sindi Harmon MD, 10 mL at 10/11/24 2054    sodium chloride flush 0.9 % injection 5-40 mL, 5-40 mL, IntraVENous, PRN, Sindi Harmon MD, 10 mL at 10/11/24 2211    0.9 % sodium chloride infusion, , IntraVENous, PRN, Sindi Harmon MD    potassium chloride (KLOR-CON) extended release tablet 40 mEq, 40 mEq, Oral, PRN **OR** potassium bicarb-citric acid (EFFER-K) effervescent tablet 40 mEq, 40 mEq, Oral, PRN **OR** potassium chloride 10 mEq/100 mL IVPB (Peripheral Line), 10 mEq, IntraVENous, PRN, Sindi Harmon MD    magnesium sulfate 2000 mg in 50 mL IVPB premix, 2,000 mg, IntraVENous, PRN, Sindi Harmon MD    ondansetron (ZOFRAN-ODT) disintegrating tablet 4 mg, 4 mg, Oral, Q8H PRN **OR** ondansetron (ZOFRAN) injection 4 mg, 4 mg, IntraVENous, Q6H PRN, Sindi Harmon MD    polyethylene glycol (GLYCOLAX) packet 17 g, 17 g, Oral, Daily PRN, Sindi Harmon MD    acetaminophen (TYLENOL) tablet 650 mg, 650 mg, Oral, Q6H PRN **OR** acetaminophen (TYLENOL) suppository 650 mg, 650 mg, Rectal, Q6H PRN, Sindi Harmon MD    sodium chloride flush 0.9 % injection 5-40 mL, 5-40 mL, IntraVENous, BID,

## 2024-10-12 NOTE — PROGRESS NOTES
[facility] Adult  Hospitalist Group                                                                                          Hospitalist Progress Note  KONRAD Oliveros - CNP  Office Phone: [phone]        Date of Service:  10/12/2024  NAME:  Kristie Rowan  :  1985  MRN:  [id number]       Admission Summary:   From HPI:  Kristie Rowan is a  39 y.o. female with apmhx past heroin abuse, and etoh dependence who presents with abdominal pain, n/v, and jaundice.  She was in her usual state of health until 1.5 weeks ago when she developed nausea, vomiting, abdominal pain and bloating.  She denies fever, chills, diarrhea, or dysuria.  She has a remote hx of IV heroin use, and has been sober for 4 years.  She states she tested negative for hep C 4 years ago.  She does drink 3 glasses of vodka or wine daily.  She denies hx withdrawal.      In the ED, she was tachycardic to 118.  Other VSS.  Lab showed WBC 19.1, macrocytic anemia with Hgb 8.8, Na 128, + 3.1, , Alk phos 599, T. Bili 10.5, and albumin 3.  US abdomen showed hepatomegaly with increased liver echogenicity and ascites, and gallbladder sludge with wall thickening an pericholecystic fluid.  MRCP was ordered, and is pending.     In the ED, she received morphine, toradol, and zofran       Interval history / Subjective:   Patient seen resting in bed complaining of pain in the right side of her abdomen and asking for pain medication.  States she has been drinking 'for years' when asked.  Discussed liver disease and the potential for lethality especially if she does not stop drinking.  Patient verbalized understanding.       Assessment & Plan:     Abnormal LFT's  Intractable abdominal pain  Jaundice  -, Alk phos 599, T. Bili 10.5 on admission  -US abdomen showed hepatomegaly with increased liver echogenicity, ascites, and gallbladder sludge with wall thickening , and pericholecystic fluid with no dilatation  -MRCP:

## 2024-10-12 NOTE — PLAN OF CARE
Problem: Discharge Planning  Goal: Discharge to home or other facility with appropriate resources  Outcome: Progressing     Problem: Pain  Goal: Verbalizes/displays adequate comfort level or baseline comfort level  10/12/2024 0922 by Lavinia Huerta RN  Outcome: Progressing  10/12/2024 0207 by Jose Jain, RN  Outcome: Progressing     Problem: Safety - Adult  Goal: Free from fall injury  Outcome: Progressing

## 2024-10-12 NOTE — CONSULTS
[facility]      Akira Calvo MD, FACP, FACG, FAASLD      BUZZ James, [facility]   Sheeba Obandoduke, [facility]   Sobeida Emeka, [facility]  Gustavo Kurtz, [facility]   Kathi Tillman, [facility]   Elvia Brownon, [facility]   [address], [address]   [place]  [address] [phone]   FAX: [phone]  [facility]   [address], [address]   [place]  [address] [phone]   FAX: [phone]       HEPATOLOGY CONSULT NOTE  I was asked to see this patient in consultation for evaluation and management of alcohol induced lvier disease.  I have reviewed the Emergency room note, Hospital admission note, Notes by all other physicians who have seen the patient during this hospitalization to date.  I have reviewed the problem list, all past medical history and the reason for this hospitalization.  I have reviewed the allergies and the medications the patient was taking at home prior to this hospitalization.    HISTORY:  The patient is a 39 y.o. female who has consumed 4-5 alcohol drinks per day for many years.      She has a h/o IVDA but stopped drug use in 2019.  She is currently on suboxone.    She takes a PPI for GERD    She came to the ED because of abdominal pain.      In the ED Laboratory studies were significant for:    Sna 128, Scr 0.68 mg, , ALT 20, , TBILI 10.5 mg, ALB 3.0 gm, WBC 19.1, HB 8.8 gms, , INR 1.2.    Imaging of the liver with Ultrasound and MRI demonstrated hepatic steatosis consistent with alcohol induced hepatitis, nodular surface to the liver suggesting cirrhosis, no significant bile duct dilation, no liver mass, ascites.    [facility]  HypoNA has already resolved.  No  need for IV albumin      Hepatology Plan:  Alcohol induced hepatitis is mild with DF <32.  NO need for steroids  Need to check hepatic panel and INR QD for 3 days to see if alcohol induced hepatitis worsens    The elevation in ALP is due to cholestatic form of alcoholic hepatitis  She can eat regular diet.      ASSESSMENT AND PLAN:  Alcohol hepatitis  There is elevation in liver transaminases in a pattern consistent with alcohol.  There is an elevation in TBILI to 10 mg.  The INR is normal at 1.2.    The DF is under 32.  The alcoholic hepatitis is mild and does not require steroids.    Alcohol induced hepatitis typically worsens over 5 days once alcohol is stopped.  Will need to monitor hepatitis panel and INR every day to see if DF increases to >32.    It is not clear if the patient has developed cirrhosis.    Ascites   Ascites developed for the first time in 10/2024.  US shows amount of ascites is mild.  She is already on IV ABX and if she had SBP this is likely already resolving and cell count will not be conclusive.    We can hold on paracentesis    The patient was counseled regarding the need to maintain sodium restriction and the types of foods containing high amounts of sodium to be avoided.    Hyponatremia  This is secondary alcohol induced hepatitis.    The Sna in the ED was 128.  This has already resolved with IV fluids    Alcohol use disorder  The patient has an alcohol use disorder that is moderate.    This is the first time has been hospitalized for an alcohol related illness,   The patient has been told that alcohol may be contributing to the liver disease but continues to consume alcohol.      SYSTEM REVIEW:  Constitution systems: Negative for fever, chills, weight gain, weight loss.   Eyes: Negative for visual changes.  ENT: Negative for sore throat, painful swallowing.   Respiratory: Negative for cough, hemoptysis, SOB.   Cardiology: Negative for chest pain, palpitations.  GI:  Negative for

## 2024-10-12 NOTE — ED NOTES
ED TO INPATIENT SBAR HANDOFF    Patient Name: Kristie Rowan   :  1985  39 y.o.   MRN:  [id number]  ED Room #:  ER04/04     Situation  Code Status: Full Code   Allergies: Patient has no known allergies.  Weight: Patient Vitals for the past 96 hrs (Last 3 readings):   Weight   10/11/24 1609 45.8 kg (100 lb 15.5 oz)       Arrived from: home    Chief Complaint:   Chief Complaint   Patient presents with    Abdominal Pain       Hospital Problem/Diagnosis:  Principal Problem:    Jaundice  Resolved Problems:    * No resolved hospital problems. *      Mobility: no current mobility problem   ED Fall Risk: No data recorded   Fell in ED or prior to admission: no   Restraints: no     Sitter: no   Family/Caregiver Present: no    Neet to know social/safety information: NPO    Background  History: No past medical history on file.    Assessment    Abnormal Assessment Findings: Jaundiced, +US (+hepatomegaly,+gallbladder sludge), Pending MRI  Imaging:   US ABDOMEN LIMITED Specify organ? GALLBLADDER, PANCREAS   Final Result      Hepatomegaly with increased liver echogenicity and ascites. Cannot rule out   portal vein thrombosis given lack of color Doppler flow although there appears   to be waveform present.      Gallbladder sludge with wall thickening and pericholecystic fluid. No   gallbladder dilation. Clinical and laboratory correlation is advised.      Electronically signed by Darryl Valadez      MRI ABDOMEN W WO CONTRAST MRCP    (Results Pending)     Abnormal labs:   Abnormal Labs Reviewed   CBC WITH AUTO DIFFERENTIAL - Abnormal; Notable for the following components:       Result Value    WBC 19.1 (*)     RBC 2.16 (*)     Hemoglobin 8.8 (*)     Hematocrit 26.8 (*)     .1 (*)     MCH 40.7 (*)     RDW 18.1 (*)     Neutrophils % 87 (*)     Lymphocytes % 8 (*)     Monocytes % 3 (*)     Immature Granulocytes % 2 (*)     Neutrophils Absolute 16.6 (*)     Immature Granulocytes Absolute 0.4 (*)     All other components  further questions, please call Sending RN at [phone]  Electronically signed by: Electronically signed by Glenn Lundberg RN on 10/11/2024 at 10:01 PM

## 2024-10-12 NOTE — CONSULTS
[facility] [address] [place] [address]        Gastroenterology Consult     Referring Physician:    Consult Date: 10/12/2024     Subjective:     Chief Complaint: Elevated LFTs    History of Present Illness: Kristie Rowan is a 39 y.o. female who is seen in consultation for elevated LFTs.  Ms. Rowan is 39 years old female with history of significant alcohol consumption and dependency.  Patient presented to the emergency room with abdominal pain nausea vomiting and jaundice.  Patient denies any prior episode of jaundice.  Patient noted that she has been feeling well until approximately 10 days ago when her symptoms started.  She denies any melena hematochezia or hematemesis.  She also has history of IV drug use.  She noted that she had hepatitis C testing several years ago that was negative.  Her hepatitis profile from this admission showed reactive hepatitis C antibody.  She consume significant amount of alcohol on daily basis for many years.  She did have imaging that showed hepatomegaly with ascites.  There was gallbladder sludge and wall thickening.  She did have an MRCP performed that showed mild sludge in the gallbladder. No significant gallbladder wall thickening or biliary dilatation.  Patient has been receiving analgesia and her abdominal pain has improved.  Her laboratory studies have been trending down since admission.    No past medical history on file.  No past surgical history on file.   No family history on file.  Social History     Tobacco Use    Smoking status: Not on file    Smokeless tobacco: Not on file   Substance Use Topics    Alcohol use: Not on file      No Known Allergies  Current Facility-Administered Medications   Medication Dose Route Frequency    naloxone (NARCAN) injection 0.4 mg  0.4 mg IntraVENous PRN    piperacillin-tazobactam (ZOSYN) 3,375 mg in sodium chloride 0.9 % 50 mL IVPB (mini-bag)  3,375 mg IntraVENous Q8H    dextrose 5 % and   9:15 AM   Result Value Ref Range    Sodium 132 (L) 136 - 145 mmol/L    Potassium 3.0 (L) 3.5 - 5.1 mmol/L    Chloride 95 (L) 97 - 108 mmol/L    CO2 31 21 - 32 mmol/L    Anion Gap 6 2 - 12 mmol/L    Glucose 92 65 - 100 mg/dL    BUN 13 6 - 20 MG/DL    Creatinine 0.68 0.55 - 1.02 MG/DL    BUN/Creatinine Ratio 19 12 - 20      Est, Glom Filt Rate >90 >60 ml/min/1.73m2    Calcium 8.2 (L) 8.5 - 10.1 MG/DL         Assessment/Plan:     Principal Problem:    Jaundice  Resolved Problems:    * No resolved hospital problems. *       Alcoholic liver disease and possible hepatitis C.  Patient LFTs has improved since admission.  She does have mild leukocytosis.  Patient discriminant function is 22.8 which is below 32 and indicate good prognosis.  Continue with current management plans  Obtain HCVRNA level  DT precautions  Consider HIDA scan  Consider diagnostic paracentesis to rule out SBP  Will follow with you for further recommendations

## 2024-10-13 LAB
ALBUMIN SERPL-MCNC: 1.9 G/DL (ref 3.5–5)
ALBUMIN SERPL-MCNC: 2.4 G/DL (ref 3.5–5)
ALBUMIN/GLOB SERPL: 0.5 (ref 1.1–2.2)
ALBUMIN/GLOB SERPL: 0.5 (ref 1.1–2.2)
ALP SERPL-CCNC: 369 U/L (ref 45–117)
ALP SERPL-CCNC: 456 U/L (ref 45–117)
ALT SERPL-CCNC: 12 U/L (ref 12–78)
ALT SERPL-CCNC: 17 U/L (ref 12–78)
ANION GAP SERPL CALC-SCNC: 6 MMOL/L (ref 2–12)
ANION GAP SERPL CALC-SCNC: 7 MMOL/L (ref 2–12)
AST SERPL-CCNC: 106 U/L (ref 15–37)
AST SERPL-CCNC: 132 U/L (ref 15–37)
BILIRUB DIRECT SERPL-MCNC: 7.9 MG/DL (ref 0–0.2)
BILIRUB SERPL-MCNC: 6.8 MG/DL (ref 0.2–1)
BILIRUB SERPL-MCNC: 8.3 MG/DL (ref 0.2–1)
BUN SERPL-MCNC: 13 MG/DL (ref 6–20)
BUN SERPL-MCNC: 13 MG/DL (ref 6–20)
BUN/CREAT SERPL: 14 (ref 12–20)
BUN/CREAT SERPL: 19 (ref 12–20)
CALCIUM SERPL-MCNC: 8.1 MG/DL (ref 8.5–10.1)
CALCIUM SERPL-MCNC: 9 MG/DL (ref 8.5–10.1)
CHLORIDE SERPL-SCNC: 100 MMOL/L (ref 97–108)
CHLORIDE SERPL-SCNC: 101 MMOL/L (ref 97–108)
CO2 SERPL-SCNC: 27 MMOL/L (ref 21–32)
CO2 SERPL-SCNC: 28 MMOL/L (ref 21–32)
COMMENT:: NORMAL
CREAT SERPL-MCNC: 0.67 MG/DL (ref 0.55–1.02)
CREAT SERPL-MCNC: 0.9 MG/DL (ref 0.55–1.02)
ERYTHROCYTE [DISTWIDTH] IN BLOOD BY AUTOMATED COUNT: 17.7 % (ref 11.5–14.5)
GLOBULIN SER CALC-MCNC: 3.5 G/DL (ref 2–4)
GLOBULIN SER CALC-MCNC: 4.8 G/DL (ref 2–4)
GLUCOSE SERPL-MCNC: 78 MG/DL (ref 65–100)
GLUCOSE SERPL-MCNC: 87 MG/DL (ref 65–100)
HCT VFR BLD AUTO: 22.3 % (ref 35–47)
HGB BLD-MCNC: 7.2 G/DL (ref 11.5–16)
INR PPP: 1.3 (ref 0.9–1.1)
MCH RBC QN AUTO: 40.9 PG (ref 26–34)
MCHC RBC AUTO-ENTMCNC: 32.3 G/DL (ref 30–36.5)
MCV RBC AUTO: 126.7 FL (ref 80–99)
NRBC # BLD: 0.02 K/UL (ref 0–0.01)
NRBC BLD-RTO: 0.1 PER 100 WBC
PLATELET # BLD AUTO: 173 K/UL (ref 150–400)
PMV BLD AUTO: 9.5 FL (ref 8.9–12.9)
POTASSIUM SERPL-SCNC: 3.4 MMOL/L (ref 3.5–5.1)
POTASSIUM SERPL-SCNC: 3.9 MMOL/L (ref 3.5–5.1)
PROT SERPL-MCNC: 5.4 G/DL (ref 6.4–8.2)
PROT SERPL-MCNC: 7.2 G/DL (ref 6.4–8.2)
PROTHROMBIN TIME: 13.4 SEC (ref 9–11.1)
RBC # BLD AUTO: 1.76 M/UL (ref 3.8–5.2)
SODIUM SERPL-SCNC: 134 MMOL/L (ref 136–145)
SODIUM SERPL-SCNC: 135 MMOL/L (ref 136–145)
SPECIMEN HOLD: NORMAL
WBC # BLD AUTO: 13.4 K/UL (ref 3.6–11)

## 2024-10-13 PROCEDURE — 6360000002 HC RX W HCPCS: Performed by: NURSE PRACTITIONER

## 2024-10-13 PROCEDURE — 85027 COMPLETE CBC AUTOMATED: CPT

## 2024-10-13 PROCEDURE — 80076 HEPATIC FUNCTION PANEL: CPT

## 2024-10-13 PROCEDURE — 99232 SBSQ HOSP IP/OBS MODERATE 35: CPT | Performed by: INTERNAL MEDICINE

## 2024-10-13 PROCEDURE — 6370000000 HC RX 637 (ALT 250 FOR IP): Performed by: FAMILY MEDICINE

## 2024-10-13 PROCEDURE — 2580000003 HC RX 258: Performed by: PHYSICIAN ASSISTANT

## 2024-10-13 PROCEDURE — 1100000000 HC RM PRIVATE

## 2024-10-13 PROCEDURE — 6360000002 HC RX W HCPCS: Performed by: FAMILY MEDICINE

## 2024-10-13 PROCEDURE — 80053 COMPREHEN METABOLIC PANEL: CPT

## 2024-10-13 PROCEDURE — 2580000003 HC RX 258: Performed by: FAMILY MEDICINE

## 2024-10-13 PROCEDURE — 99232 SBSQ HOSP IP/OBS MODERATE 35: CPT | Performed by: SURGERY

## 2024-10-13 PROCEDURE — 6370000000 HC RX 637 (ALT 250 FOR IP): Performed by: NURSE PRACTITIONER

## 2024-10-13 PROCEDURE — 2580000003 HC RX 258: Performed by: NURSE PRACTITIONER

## 2024-10-13 PROCEDURE — 36415 COLL VENOUS BLD VENIPUNCTURE: CPT

## 2024-10-13 PROCEDURE — 85610 PROTHROMBIN TIME: CPT

## 2024-10-13 RX ORDER — POTASSIUM CHLORIDE 750 MG/1
40 TABLET, EXTENDED RELEASE ORAL ONCE
Status: COMPLETED | OUTPATIENT
Start: 2024-10-13 | End: 2024-10-13

## 2024-10-13 RX ORDER — CALCIUM CARBONATE 500 MG/1
500 TABLET, CHEWABLE ORAL 3 TIMES DAILY PRN
Status: DISCONTINUED | OUTPATIENT
Start: 2024-10-13 | End: 2024-10-25 | Stop reason: HOSPADM

## 2024-10-13 RX ADMIN — Medication 10 ML: at 21:18

## 2024-10-13 RX ADMIN — ONDANSETRON 4 MG: 2 INJECTION INTRAMUSCULAR; INTRAVENOUS at 04:29

## 2024-10-13 RX ADMIN — ONDANSETRON 4 MG: 2 INJECTION INTRAMUSCULAR; INTRAVENOUS at 14:42

## 2024-10-13 RX ADMIN — SODIUM CHLORIDE, PRESERVATIVE FREE 10 ML: 5 INJECTION INTRAVENOUS at 21:18

## 2024-10-13 RX ADMIN — MORPHINE SULFATE 2 MG: 2 INJECTION, SOLUTION INTRAMUSCULAR; INTRAVENOUS at 06:09

## 2024-10-13 RX ADMIN — PIPERACILLIN AND TAZOBACTAM 3375 MG: 3; .375 INJECTION, POWDER, LYOPHILIZED, FOR SOLUTION INTRAVENOUS at 14:39

## 2024-10-13 RX ADMIN — PANTOPRAZOLE SODIUM 40 MG: 40 INJECTION, POWDER, FOR SOLUTION INTRAVENOUS at 13:36

## 2024-10-13 RX ADMIN — MORPHINE SULFATE 2 MG: 2 INJECTION, SOLUTION INTRAMUSCULAR; INTRAVENOUS at 14:39

## 2024-10-13 RX ADMIN — PIPERACILLIN AND TAZOBACTAM 3375 MG: 3; .375 INJECTION, POWDER, LYOPHILIZED, FOR SOLUTION INTRAVENOUS at 06:41

## 2024-10-13 RX ADMIN — FOLIC ACID TAB 400 MCG 1 MG: 400 TAB at 08:36

## 2024-10-13 RX ADMIN — CALCIUM CARBONATE (ANTACID) CHEW TAB 500 MG 500 MG: 500 CHEW TAB at 13:36

## 2024-10-13 RX ADMIN — SODIUM CHLORIDE, PRESERVATIVE FREE 10 ML: 5 INJECTION INTRAVENOUS at 21:17

## 2024-10-13 RX ADMIN — THERA TABS 1 TABLET: TAB at 08:36

## 2024-10-13 RX ADMIN — POTASSIUM CHLORIDE 40 MEQ: 750 TABLET, EXTENDED RELEASE ORAL at 11:22

## 2024-10-13 RX ADMIN — CALCIUM CARBONATE (ANTACID) CHEW TAB 500 MG 500 MG: 500 CHEW TAB at 21:23

## 2024-10-13 RX ADMIN — ONDANSETRON 4 MG: 2 INJECTION INTRAMUSCULAR; INTRAVENOUS at 21:23

## 2024-10-13 RX ADMIN — Medication 100 MG: at 08:36

## 2024-10-13 RX ADMIN — MORPHINE SULFATE 2 MG: 2 INJECTION, SOLUTION INTRAMUSCULAR; INTRAVENOUS at 21:20

## 2024-10-13 RX ADMIN — MORPHINE SULFATE 2 MG: 2 INJECTION, SOLUTION INTRAMUSCULAR; INTRAVENOUS at 10:40

## 2024-10-13 RX ADMIN — PIPERACILLIN AND TAZOBACTAM 3375 MG: 3; .375 INJECTION, POWDER, LYOPHILIZED, FOR SOLUTION INTRAVENOUS at 23:07

## 2024-10-13 ASSESSMENT — PAIN DESCRIPTION - LOCATION
LOCATION: ABDOMEN

## 2024-10-13 ASSESSMENT — PAIN SCALES - WONG BAKER
WONGBAKER_NUMERICALRESPONSE: NO HURT

## 2024-10-13 ASSESSMENT — PAIN SCALES - GENERAL
PAINLEVEL_OUTOF10: 8
PAINLEVEL_OUTOF10: 4
PAINLEVEL_OUTOF10: 7
PAINLEVEL_OUTOF10: 8
PAINLEVEL_OUTOF10: 9
PAINLEVEL_OUTOF10: 9
PAINLEVEL_OUTOF10: 8
PAINLEVEL_OUTOF10: 9

## 2024-10-13 ASSESSMENT — PAIN DESCRIPTION - FREQUENCY: FREQUENCY: CONTINUOUS

## 2024-10-13 ASSESSMENT — PAIN DESCRIPTION - DESCRIPTORS: DESCRIPTORS: ACHING

## 2024-10-13 NOTE — PROGRESS NOTES
[facility] Adult  Hospitalist Group                                                                                          Hospitalist Progress Note  KONRAD Oliveros - CNP  Office Phone: [phone]        Date of Service:  10/13/2024  NAME:  Kristie Rowan  :  1985  MRN:  [id number]       Admission Summary:   From HPI:  Kristie Rowan is a  39 y.o. female with apmhx past heroin abuse, and etoh dependence who presents with abdominal pain, n/v, and jaundice.  She was in her usual state of health until 1.5 weeks ago when she developed nausea, vomiting, abdominal pain and bloating.  She denies fever, chills, diarrhea, or dysuria.  She has a remote hx of IV heroin use, and has been sober for 4 years.  She states she tested negative for hep C 4 years ago.  She does drink 3 glasses of vodka or wine daily.  She denies hx withdrawal.      In the ED, she was tachycardic to 118.  Other VSS.  Lab showed WBC 19.1, macrocytic anemia with Hgb 8.8, Na 128, + 3.1, , Alk phos 599, T. Bili 10.5, and albumin 3.  US abdomen showed hepatomegaly with increased liver echogenicity and ascites, and gallbladder sludge with wall thickening an pericholecystic fluid.  MRCP was ordered, and is pending.     In the ED, she received morphine, toradol, and zofran       Interval history / Subjective:   Patient seen walking around in room today, states she is still having some pain but is overall feeling better and has been able to eat some full liquids.  Seems serious about quitting drinking.  HIDA is planned for tomorrow so will be NPO after midnight.       Assessment & Plan:     Abnormal LFT's : Alcohol hepatitis  Intractable abdominal pain  Jaundice  -, Alk phos 599, T. Bili 10.5 on admission  -US abdomen showed hepatomegaly with increased liver echogenicity, ascites, and gallbladder sludge with wall thickening , and pericholecystic fluid with no dilatation  -MRCP: Enlarged liver with

## 2024-10-13 NOTE — PROGRESS NOTES
HIDA scan order received. Will complete 10/14/2024 at 7am     Please keep NPO at midnight and do not administer any opioid narcotics after 2am in preparation for the test.

## 2024-10-13 NOTE — PROGRESS NOTES
Surgery Progress Note    10/13/2024    Admit Date: 10/11/2024  4:42 PM    CC: Abdominal pain    Subjective:     Pain improving.  Tolerating a full liquid diet.  Feels relatively bloated    Constitutional: No fever or chills  Neurologic: No headache  Eyes: No scleral icterus or irritated eyes  Nose: No nasal pain or drainage  Mouth: No oral lesions or sore throat  Cardiac: No palpations or chest pain  Pulmonary: No cough or shortness of breath  Gastrointestinal: Abdominal pain, no nausea, emesis, diarrhea, or constipation  Genitourinary: No dysuria  Musculoskeletal: No muscle or joint tenderness  Skin: No rashes or lesions  Psychiatric: No anxiety or depressed mood    Objective:     Vitals:    10/13/24 0830   BP: 114/89   Pulse: 90   Resp: 16   Temp: 99.2 °F (37.3 °C)   SpO2: 94%       General: No acute distress, conversant  Eyes: PERRLA, no scleral icterus  HENT: Normocephalic without oral lesions  Neck: Trachea midline without LAD  Cardiac: Normal pulse rate and rhythm  Pulmonary: Symmetric chest rise with normal effort  GI: Soft, mild tenderness upper quadrant, moderate distention and fluid wave, no hernia, no splenomegaly  Skin: Warm without rash  Extremities: No edema or joint stiffness  Psych: Appropriate mood and affect    Labs, vital signs, and I/O reviewed.    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  Imaging reviewed today.  Plan for HIDA scan tomorrow.  N.p.o. at midnight.    Param Vargas MD, [facility]  Bariatric and General Surgeon  [facility]

## 2024-10-13 NOTE — PROGRESS NOTES
[facility] LIVER [facility]      Akira Calvo MD, FACP, FACG, FAASLD      BUZZ James, [facility]   Sheeba Roberson, [facility]   Sobeida Emeka, [facility]  Gustavo Kurtz, [facility]   Kathi Tillman, [facility]   Elvia Coley, [facility]      Liver [facility]   [address], [address]   [place]  [address] [phone]   FAX: [phone]  [facility]   [address], [address]   [place]  [address] [phone]   FAX: [phone]       HEPATOLOGY NOTE  The patient is a 39 y.o. female who has consumed 4-5 alcohol drinks per day for many years.    She also has a h/o IVDA but stopped drug use in 2019.  She is currently on suboxone.  She takes a PPI for GERD    She came to the ED because of abdominal pain.      In the ED Laboratory studies were significant for:    Sna 128, Scr 0.68 mg, , ALT 20, , TBILI 10.5 mg, ALB 3.0 gm, WBC 19.1, HB 8.8 gms, , INR 1.2.    Imaging of the liver with Ultrasound and MRI demonstrated hepatic steatosis consistent with alcohol induced hepatitis, nodular surface to the liver suggesting cirrhosis, no significant bile duct dilation, no liver mass, ascites.    Hospital Course to date:  WAS protocol and mangement for alcohol withdrawal.  So far no withdrawal  HypoNA has already resolved.  No need for IV albumin    TBILI has come deom from 10 to 8 mg  INR stable in 1.2-1.3 range.  She has developed more ascites    Hepatology Plan:  Alcohol induced hepatitis is mild and DF remains <<32.  NO need for steroids    The elevation in ALP is due to cholestatic form of alcoholic hepatitis    Will start step 1/2 diuretics  US paracentesis in AM.  Send fluid for cell count, albumin, TP      ASSESSMENT AND PLAN:  Alcohol hepatitis  There is elevation in liver transaminases in a  mmol/L 89 (L) 95 (L) 100   CARBON DIOXIDE 21 - 32 mmol/L 29 29 27   BUN,BUNPL 6 - 20 MG/DL 15 13 13   Creatinine 0.55 - 1.02 MG/DL 0.68 0.74 0.90   Albumin 3.5 - 5.0 g/dL 3.0 (L) 2.0 (L) 2.4 (L)   Alkaline Phosphatase 45 - 117 U/L 599 (H) 392 (H) 456 (H)   ALT 12 - 78 U/L 20 16 17   AST 15 - 37 U/L 113 (H) 87 (H) 132 (H)   Total Bilirubin 0.2 - 1.0 MG/DL 10.5 (H) 7.1 (H)  6.9 (H) 8.3 (H)   WBC 3.6 - 11.0 K/uL 19.1 (H)  13.4 (H)   Hemoglobin Quant 11.5 - 16.0 g/dL 8.8 (L)  7.2 (L)   Platelet Count 150 - 400 K/uL 243  173   INR 0.9 - 1.1   1.2 (H)  1.3 (H)   (L): Data is abnormally low  (H): Data is abnormally high      Akira Calvo MD  [facility] Liver [address], [address]  [place]  [address] [phone]  [facility]

## 2024-10-13 NOTE — PROGRESS NOTES
[facility] [address] [place] [address]             GI PROGRESS NOTE        NAME: Kristie Rowan   : 1985   MRN: [id number]       Subjective:   Feeling better today   Tolerating diet      Objective:   Still complaining of diffuse abdominal pain  HIDA scan is scheduled for tomorrow      VITALS:   Last 24hrs VS reviewed since prior progress note. Most recent are:  Vitals:    10/13/24 1110   BP:    Pulse:    Resp: 14   Temp:    SpO2:      No intake or output data in the 24 hours ending 10/13/24 1338    PHYSICAL EXAM:  General: Chronically ill-appearing, in no acute distress    HEENT: Anicteric sclerae.  Lungs:            CTA Bilaterally.   Heart:  Regular  rhythm,    Abdomen: Soft, Non distended, diffusely tender, no rebound.  (+)Bowel sounds, no HSM  Extremities: No c/c/e  Neurologic:  No asterixis   Psych:   Good insight. Not anxious nor agitated.    Lab Data Reviewed:   Recent Labs     10/11/24  1618 10/13/24  0955   WBC 19.1* 13.4*   HGB 8.8* 7.2*   HCT 26.8* 22.3*    173     Recent Labs     10/13/24  0506 10/13/24  0955   * 134*   K 3.9 3.4*    100   CO2 28 27   BUN 13 13     Recent Labs     10/13/24  0506 10/13/24  0955   GLOB 3.5 4.8*       ________________________________________________________________________  Patient Active Problem List   Diagnosis    Jaundice    Alcohol use disorder, moderate, dependence (HCC)    Alcoholic hepatitis with ascites    Ascites due to alcoholic hepatitis    Hyponatremia        Assessment:   Alcoholic hepatitis patient clinically improving currently on antibiotics  Cholelithiasis, negative MRCP and abdominal pain HIDA scan pending surgery following  HCVRNA antibody reactive   Plan:   Continue current management plans  Will follow with you  Patient need supportive care postdischarge to help her abstain from alcohol       Signed By: Karen Sales MD    10/13/2024  1:38 PM

## 2024-10-14 ENCOUNTER — APPOINTMENT (OUTPATIENT)
Facility: HOSPITAL | Age: 39
End: 2024-10-14
Payer: MEDICAID

## 2024-10-14 LAB
-: ABNORMAL
ALBUMIN SERPL-MCNC: 1.7 G/DL (ref 3.5–5)
ALBUMIN/GLOB SERPL: 0.5 (ref 1.1–2.2)
ALP SERPL-CCNC: 323 U/L (ref 45–117)
ALT SERPL-CCNC: 13 U/L (ref 12–78)
ANION GAP SERPL CALC-SCNC: 8 MMOL/L (ref 2–12)
AST SERPL-CCNC: 100 U/L (ref 15–37)
BILIRUB SERPL-MCNC: 5.9 MG/DL (ref 0.2–1)
BUN SERPL-MCNC: 11 MG/DL (ref 6–20)
BUN/CREAT SERPL: 17 (ref 12–20)
CALCIUM SERPL-MCNC: 8.4 MG/DL (ref 8.5–10.1)
CHLORIDE SERPL-SCNC: 106 MMOL/L (ref 97–108)
CO2 SERPL-SCNC: 24 MMOL/L (ref 21–32)
CREAT SERPL-MCNC: 0.66 MG/DL (ref 0.55–1.02)
ERYTHROCYTE [DISTWIDTH] IN BLOOD BY AUTOMATED COUNT: 17.6 % (ref 11.5–14.5)
ERYTHROCYTE [DISTWIDTH] IN BLOOD BY AUTOMATED COUNT: 17.6 % (ref 11.5–14.5)
GLOBULIN SER CALC-MCNC: 3.7 G/DL (ref 2–4)
GLUCOSE SERPL-MCNC: 78 MG/DL (ref 65–100)
HAV IGM SER QL: NONREACTIVE
HBV CORE IGM SER QL: NONREACTIVE
HBV SURFACE AG SER QL: <0.1 INDEX
HBV SURFACE AG SER QL: NEGATIVE
HCT VFR BLD AUTO: 14.1 % (ref 35–47)
HCT VFR BLD AUTO: 16.6 % (ref 35–47)
HCT VFR BLD AUTO: 18.5 % (ref 35–47)
HCV AB SER IA-ACNC: 8.77 INDEX
HCV AB SERPL QL IA: REACTIVE
HGB BLD-MCNC: 4.5 G/DL (ref 11.5–16)
HGB BLD-MCNC: 5.3 G/DL (ref 11.5–16)
HGB BLD-MCNC: 6.2 G/DL (ref 11.5–16)
HISTORY CHECK: NORMAL
HISTORY CHECK: NORMAL
INR PPP: 1.4 (ref 0.9–1.1)
MCH RBC QN AUTO: 40.5 PG (ref 26–34)
MCH RBC QN AUTO: 41.1 PG (ref 26–34)
MCHC RBC AUTO-ENTMCNC: 31.9 G/DL (ref 30–36.5)
MCHC RBC AUTO-ENTMCNC: 31.9 G/DL (ref 30–36.5)
MCV RBC AUTO: 127 FL (ref 80–99)
MCV RBC AUTO: 128.7 FL (ref 80–99)
NRBC # BLD: 0 K/UL (ref 0–0.01)
NRBC # BLD: 0 K/UL (ref 0–0.01)
NRBC BLD-RTO: 0 PER 100 WBC
NRBC BLD-RTO: 0 PER 100 WBC
PLATELET # BLD AUTO: 111 K/UL (ref 150–400)
PLATELET # BLD AUTO: 123 K/UL (ref 150–400)
PMV BLD AUTO: 9.4 FL (ref 8.9–12.9)
PMV BLD AUTO: 9.9 FL (ref 8.9–12.9)
POTASSIUM SERPL-SCNC: 4 MMOL/L (ref 3.5–5.1)
PROT SERPL-MCNC: 5.4 G/DL (ref 6.4–8.2)
PROTHROMBIN TIME: 14 SEC (ref 9–11.1)
RBC # BLD AUTO: 1.11 M/UL (ref 3.8–5.2)
RBC # BLD AUTO: 1.29 M/UL (ref 3.8–5.2)
SODIUM SERPL-SCNC: 138 MMOL/L (ref 136–145)
WBC # BLD AUTO: 7.6 K/UL (ref 3.6–11)
WBC # BLD AUTO: 8.2 K/UL (ref 3.6–11)

## 2024-10-14 PROCEDURE — P9047 ALBUMIN (HUMAN), 25%, 50ML: HCPCS | Performed by: NURSE PRACTITIONER

## 2024-10-14 PROCEDURE — 36415 COLL VENOUS BLD VENIPUNCTURE: CPT

## 2024-10-14 PROCEDURE — 80053 COMPREHEN METABOLIC PANEL: CPT

## 2024-10-14 PROCEDURE — 85610 PROTHROMBIN TIME: CPT

## 2024-10-14 PROCEDURE — 2580000003 HC RX 258: Performed by: PHYSICIAN ASSISTANT

## 2024-10-14 PROCEDURE — 6360000004 HC RX CONTRAST MEDICATION: Performed by: SURGERY

## 2024-10-14 PROCEDURE — 36430 TRANSFUSION BLD/BLD COMPNT: CPT

## 2024-10-14 PROCEDURE — 6360000002 HC RX W HCPCS: Performed by: NURSE PRACTITIONER

## 2024-10-14 PROCEDURE — P9016 RBC LEUKOCYTES REDUCED: HCPCS

## 2024-10-14 PROCEDURE — 85018 HEMOGLOBIN: CPT

## 2024-10-14 PROCEDURE — 3430000000 HC RX DIAGNOSTIC RADIOPHARMACEUTICAL: Performed by: SURGERY

## 2024-10-14 PROCEDURE — 2580000003 HC RX 258: Performed by: FAMILY MEDICINE

## 2024-10-14 PROCEDURE — 6360000002 HC RX W HCPCS: Performed by: FAMILY MEDICINE

## 2024-10-14 PROCEDURE — 2580000003 HC RX 258: Performed by: NURSE PRACTITIONER

## 2024-10-14 PROCEDURE — 6370000000 HC RX 637 (ALT 250 FOR IP): Performed by: NURSE PRACTITIONER

## 2024-10-14 PROCEDURE — 6370000000 HC RX 637 (ALT 250 FOR IP): Performed by: FAMILY MEDICINE

## 2024-10-14 PROCEDURE — 86923 COMPATIBILITY TEST ELECTRIC: CPT

## 2024-10-14 PROCEDURE — 86850 RBC ANTIBODY SCREEN: CPT

## 2024-10-14 PROCEDURE — A9537 TC99M MEBROFENIN: HCPCS | Performed by: SURGERY

## 2024-10-14 PROCEDURE — 78227 HEPATOBIL SYST IMAGE W/DRUG: CPT

## 2024-10-14 PROCEDURE — 86900 BLOOD TYPING SEROLOGIC ABO: CPT

## 2024-10-14 PROCEDURE — 2580000003 HC RX 258: Performed by: SURGERY

## 2024-10-14 PROCEDURE — 76705 ECHO EXAM OF ABDOMEN: CPT

## 2024-10-14 PROCEDURE — 85027 COMPLETE CBC AUTOMATED: CPT

## 2024-10-14 PROCEDURE — 85014 HEMATOCRIT: CPT

## 2024-10-14 PROCEDURE — 1100000000 HC RM PRIVATE

## 2024-10-14 PROCEDURE — 86901 BLOOD TYPING SEROLOGIC RH(D): CPT

## 2024-10-14 PROCEDURE — 87522 HEPATITIS C REVRS TRNSCRPJ: CPT

## 2024-10-14 PROCEDURE — 80074 ACUTE HEPATITIS PANEL: CPT

## 2024-10-14 RX ORDER — SODIUM CHLORIDE 9 MG/ML
INJECTION, SOLUTION INTRAVENOUS PRN
Status: DISCONTINUED | OUTPATIENT
Start: 2024-10-14 | End: 2024-10-25 | Stop reason: HOSPADM

## 2024-10-14 RX ORDER — MORPHINE SULFATE 2 MG/ML
2 INJECTION, SOLUTION INTRAMUSCULAR; INTRAVENOUS ONCE
Status: COMPLETED | OUTPATIENT
Start: 2024-10-14 | End: 2024-10-14

## 2024-10-14 RX ORDER — ALBUMIN (HUMAN) 12.5 G/50ML
50 SOLUTION INTRAVENOUS ONCE
Status: COMPLETED | OUTPATIENT
Start: 2024-10-14 | End: 2024-10-14

## 2024-10-14 RX ORDER — SODIUM CHLORIDE 9 MG/ML
INJECTION, SOLUTION INTRAVENOUS ONCE
Status: COMPLETED | OUTPATIENT
Start: 2024-10-14 | End: 2024-10-14

## 2024-10-14 RX ORDER — SIMETHICONE 80 MG
80 TABLET,CHEWABLE ORAL EVERY 6 HOURS PRN
Status: DISCONTINUED | OUTPATIENT
Start: 2024-10-14 | End: 2024-10-25 | Stop reason: HOSPADM

## 2024-10-14 RX ORDER — FUROSEMIDE 20 MG/1
20 TABLET ORAL DAILY
Status: DISCONTINUED | OUTPATIENT
Start: 2024-10-14 | End: 2024-10-25 | Stop reason: HOSPADM

## 2024-10-14 RX ORDER — KIT FOR THE PREPARATION OF TECHNETIUM TC 99M MEBROFENIN 45 MG/10ML
5.2 INJECTION, POWDER, LYOPHILIZED, FOR SOLUTION INTRAVENOUS
Status: COMPLETED | OUTPATIENT
Start: 2024-10-14 | End: 2024-10-14

## 2024-10-14 RX ORDER — SINCALIDE 5 UG/5ML
0.02 INJECTION, POWDER, LYOPHILIZED, FOR SOLUTION INTRAVENOUS ONCE
Status: COMPLETED | OUTPATIENT
Start: 2024-10-14 | End: 2024-10-14

## 2024-10-14 RX ORDER — SPIRONOLACTONE 25 MG/1
50 TABLET ORAL DAILY
Status: DISCONTINUED | OUTPATIENT
Start: 2024-10-14 | End: 2024-10-25 | Stop reason: HOSPADM

## 2024-10-14 RX ADMIN — MORPHINE SULFATE 2 MG: 2 INJECTION, SOLUTION INTRAMUSCULAR; INTRAVENOUS at 13:07

## 2024-10-14 RX ADMIN — PANTOPRAZOLE SODIUM 40 MG: 40 INJECTION, POWDER, FOR SOLUTION INTRAVENOUS at 13:09

## 2024-10-14 RX ADMIN — MORPHINE SULFATE 2 MG: 2 INJECTION, SOLUTION INTRAMUSCULAR; INTRAVENOUS at 15:11

## 2024-10-14 RX ADMIN — Medication 10 ML: at 21:08

## 2024-10-14 RX ADMIN — SODIUM CHLORIDE, PRESERVATIVE FREE 10 ML: 5 INJECTION INTRAVENOUS at 21:08

## 2024-10-14 RX ADMIN — ALBUMIN (HUMAN) 50 G: 0.25 INJECTION, SOLUTION INTRAVENOUS at 09:19

## 2024-10-14 RX ADMIN — SODIUM CHLORIDE 30 ML: 9 INJECTION, SOLUTION INTRAVENOUS at 12:15

## 2024-10-14 RX ADMIN — KIT FOR THE PREPARATION OF TECHNETIUM TC 99M MEBROFENIN 5.2 MILLICURIE: 45 INJECTION, POWDER, LYOPHILIZED, FOR SOLUTION INTRAVENOUS at 10:55

## 2024-10-14 RX ADMIN — PIPERACILLIN AND TAZOBACTAM 3375 MG: 3; .375 INJECTION, POWDER, LYOPHILIZED, FOR SOLUTION INTRAVENOUS at 06:27

## 2024-10-14 RX ADMIN — MORPHINE SULFATE 2 MG: 2 INJECTION, SOLUTION INTRAMUSCULAR; INTRAVENOUS at 23:21

## 2024-10-14 RX ADMIN — THERA TABS 1 TABLET: TAB at 13:39

## 2024-10-14 RX ADMIN — SODIUM CHLORIDE, PRESERVATIVE FREE 10 ML: 5 INJECTION INTRAVENOUS at 21:09

## 2024-10-14 RX ADMIN — PIPERACILLIN AND TAZOBACTAM 3375 MG: 3; .375 INJECTION, POWDER, LYOPHILIZED, FOR SOLUTION INTRAVENOUS at 23:23

## 2024-10-14 RX ADMIN — MORPHINE SULFATE 2 MG: 2 INJECTION, SOLUTION INTRAMUSCULAR; INTRAVENOUS at 06:25

## 2024-10-14 RX ADMIN — ONDANSETRON 4 MG: 2 INJECTION INTRAMUSCULAR; INTRAVENOUS at 06:33

## 2024-10-14 RX ADMIN — SINCALIDE 0.92 MCG: 5 INJECTION, POWDER, LYOPHILIZED, FOR SOLUTION INTRAVENOUS at 12:10

## 2024-10-14 RX ADMIN — SIMETHICONE 80 MG: 80 TABLET, CHEWABLE ORAL at 16:56

## 2024-10-14 RX ADMIN — PIPERACILLIN AND TAZOBACTAM 3375 MG: 3; .375 INJECTION, POWDER, LYOPHILIZED, FOR SOLUTION INTRAVENOUS at 16:05

## 2024-10-14 RX ADMIN — Medication 100 MG: at 13:38

## 2024-10-14 RX ADMIN — MORPHINE SULFATE 2 MG: 2 INJECTION, SOLUTION INTRAMUSCULAR; INTRAVENOUS at 18:24

## 2024-10-14 RX ADMIN — FOLIC ACID TAB 400 MCG 1 MG: 400 TAB at 13:39

## 2024-10-14 ASSESSMENT — PAIN SCALES - GENERAL
PAINLEVEL_OUTOF10: 7
PAINLEVEL_OUTOF10: 9
PAINLEVEL_OUTOF10: 10
PAINLEVEL_OUTOF10: 7
PAINLEVEL_OUTOF10: 3
PAINLEVEL_OUTOF10: 4
PAINLEVEL_OUTOF10: 0
PAINLEVEL_OUTOF10: 10

## 2024-10-14 ASSESSMENT — PAIN DESCRIPTION - LOCATION
LOCATION: GENERALIZED
LOCATION: ABDOMEN

## 2024-10-14 ASSESSMENT — PAIN DESCRIPTION - DESCRIPTORS
DESCRIPTORS: ACHING
DESCRIPTORS: ACHING;TENDER;SQUEEZING
DESCRIPTORS: ACHING;STABBING;SHARP

## 2024-10-14 ASSESSMENT — PAIN DESCRIPTION - ORIENTATION
ORIENTATION: RIGHT
ORIENTATION: RIGHT;LEFT

## 2024-10-14 NOTE — PROGRESS NOTES
[facility] Adult  Hospitalist Group                                                                                          Hospitalist Progress Note  KONRAD Oliveros - CNP  Office Phone: [phone]        Date of Service:  10/14/2024  NAME:  Kristie Rowan  :  1985  MRN:  [id number]       Admission Summary:   From HPI:  Kristie Rowan is a  39 y.o. female with apmhx past heroin abuse, and etoh dependence who presents with abdominal pain, n/v, and jaundice.  She was in her usual state of health until 1.5 weeks ago when she developed nausea, vomiting, abdominal pain and bloating.  She denies fever, chills, diarrhea, or dysuria.  She has a remote hx of IV heroin use, and has been sober for 4 years.  She states she tested negative for hep C 4 years ago.  She does drink 3 glasses of vodka or wine daily.  She denies hx withdrawal.      In the ED, she was tachycardic to 118.  Other VSS.  Lab showed WBC 19.1, macrocytic anemia with Hgb 8.8, Na 128, + 3.1, , Alk phos 599, T. Bili 10.5, and albumin 3.  US abdomen showed hepatomegaly with increased liver echogenicity and ascites, and gallbladder sludge with wall thickening an pericholecystic fluid.  MRCP was ordered, and is pending.     In the ED, she received morphine, toradol, and zofran       Interval history / Subjective:   Patient seen resting in bed in NAD.  Discussed hemoglobin levels today, no obvious s/sx of bleeding.  Patient willing to receive blood transfusion.  Awaiting her US paracentesis and HIDA scan today.  No additional complaints.  Has been tolerating her diet so far.       Assessment & Plan:     Abnormal LFT's : Alcohol hepatitis  Intractable abdominal pain  Jaundice  -, Alk phos 599, T. Bili 10.5 on admission  -US abdomen showed hepatomegaly with increased liver echogenicity, ascites, and gallbladder sludge with wall thickening , and pericholecystic fluid with no dilatation  -MRCP: Enlarged liver with  with KCl 20 mEq infusion   IntraVENous Continuous    sodium chloride flush 0.9 % injection 5-40 mL  5-40 mL IntraVENous 2 times per day    sodium chloride flush 0.9 % injection 5-40 mL  5-40 mL IntraVENous PRN    0.9 % sodium chloride infusion   IntraVENous PRN    thiamine tablet 100 mg  100 mg Oral Daily    LORazepam (ATIVAN) tablet 1 mg  1 mg Oral Q1H PRN    Or    LORazepam (ATIVAN) injection 1 mg  1 mg IntraVENous Q1H PRN    Or    LORazepam (ATIVAN) tablet 2 mg  2 mg Oral Q1H PRN    Or    LORazepam (ATIVAN) injection 2 mg  2 mg IntraVENous Q1H PRN    Or    LORazepam (ATIVAN) tablet 3 mg  3 mg Oral Q1H PRN    Or    LORazepam (ATIVAN) injection 3 mg  3 mg IntraVENous Q1H PRN    Or    LORazepam (ATIVAN) tablet 4 mg  4 mg Oral Q1H PRN    Or    LORazepam (ATIVAN) injection 4 mg  4 mg IntraVENous Q1H PRN    multivitamin 1 tablet  1 tablet Oral Daily    folic acid (FOLVITE) tablet 1 mg  1 mg Oral Daily    morphine (PF) injection 2 mg  2 mg IntraVENous Q4H PRN    sodium chloride flush 0.9 % injection 5-40 mL  5-40 mL IntraVENous 2 times per day    0.9 % sodium chloride infusion   IntraVENous PRN    magnesium sulfate 2000 mg in 50 mL IVPB premix  2,000 mg IntraVENous PRN    ondansetron (ZOFRAN-ODT) disintegrating tablet 4 mg  4 mg Oral Q8H PRN    Or    ondansetron (ZOFRAN) injection 4 mg  4 mg IntraVENous Q6H PRN    polyethylene glycol (GLYCOLAX) packet 17 g  17 g Oral Daily PRN    acetaminophen (TYLENOL) tablet 650 mg  650 mg Oral Q6H PRN    Or    acetaminophen (TYLENOL) suppository 650 mg  650 mg Rectal Q6H PRN    sodium chloride flush 0.9 % injection 5-40 mL  5-40 mL IntraVENous BID     CRITICAL CARE ATTESTATION:  I had a face to face encounter with the patient, reviewed and interpreted patient data including clinical events, labs, images, vital signs, I/O's, and examined patient.  I have discussed the case and the plan and management of the patient's care with the consulting services, the bedside nurses and necessary

## 2024-10-14 NOTE — PLAN OF CARE
Problem: Discharge Planning  Goal: Discharge to home or other facility with appropriate resources  10/13/2024 2218 by Marisol Tobin RN  Outcome: Progressing  10/13/2024 0932 by Lavinia Huerta RN  Outcome: Progressing     Problem: Pain  Goal: Verbalizes/displays adequate comfort level or baseline comfort level  10/13/2024 2218 by Marisol Tobin, RN  Outcome: Progressing  10/13/2024 0932 by Lavinia Huerta, RN  Outcome: Progressing     Problem: Safety - Adult  Goal: Free from fall injury  10/13/2024 2218 by Marisol Tobin, RN  Outcome: Progressing  10/13/2024 0932 by Lavinia Huerta, RN  Outcome: Progressing

## 2024-10-14 NOTE — PROGRESS NOTES
Comprehensive Nutrition Assessment    Type and Reason for Visit: Initial (low bmi)    Nutrition Recommendations/Plan:   Continue regular, GI bland diet  Trial Ensure Plant Based ONS BID  Monitor weight, standing scale if possible  Continue current vitamin/mineral supplementation     Malnutrition Assessment:  Malnutrition Status:  Moderate malnutrition (10/14/24 1405)    Context:  Acute Illness     Findings of the 6 clinical characteristics of malnutrition:  Energy Intake:  50% or less of estimated energy requirements for 5 or more days  Weight Loss:  Unable to assess     Body Fat Loss:  Mild body fat loss Buccal region, Orbital   Muscle Mass Loss:  Mild muscle mass loss Temples (temporalis), Clavicles (pectoralis & deltoids)  Fluid Accumulation:  Unable to assess     Strength:  Not Performed     Nutrition Assessment:    38 yo female admitted for jaundice, presented w abd pain, N/V, jaundice, decreased appetite x 1-2 weeks. PMH of alcohol abuse, heroin abuse.    10/14: RD evaluation for low BMI. Imaging showed hepatomegaly with ascites, gallbladder sludge and wall thickening. Pt with alcohol hepatitis. NPO for HIDA today.   Spoke with pt at bedside, she reports tolerating CLD well and is voicing that she is hungry. PTA she reports not eating anything besides alcohol x 1 week d/t being in pain and N/V. She reports a UBW of 105#, #. She reports weighing 130# about 1.5-2 years ago. Weight loss has been from alcohol consumption and poor PO intake. Unable to find weights in chart besides this admission and OP visit in 2016. She is interested in gaining some of the weight back, she is agreeable to trying Ensure Plant Based ONS BID as milk does not agree with her stomach at times. Discussed some high calorie foods for the pt to try at home. RD to continue to monitor PO/ONS intake and weight.    Nutritionally Significant Medications:  Folic acid, lasix, MVI, protonix, zosyn, KCl, spironolactone,  thiamine    Estimated Daily Nutrient Needs:  Energy Requirements Based On: Kcal/kg  Weight Used for Energy Requirements: Current  Energy (kcal/day): 3014-2730 (35-40 kcal/kg)  Weight Used for Protein Requirements: Current  Protein (g/day): 69-78 (1.5-1.7 g/kg)  Method Used for Fluid Requirements: 1 ml/kcal  Fluid (ml/day): 8107-6207 ml    Nutrition Related Findings:   Edema:                      Recent Labs     10/12/24  0915 10/13/24  0506 10/13/24  0955 10/14/24  0625   GLUCOSE 92 87 78 78   BUN 13 13 13 11   CREATININE 0.68 0.67 0.90 0.66   * 135* 134* 138   K 3.0* 3.9 3.4* 4.0   CL 95* 101 100 106   CO2 31 28 27 24   CALCIUM 8.2* 8.1* 9.0 8.4*     Last BM:      Wounds:   Wound Type: None    Current Nutrition Therapies:  Diet: NPO  Supplements: none  Meal Intake:   No data found.  Supplement Intake:  No data found.  Nutrition Support: none    Anthropometric Measures:  Height: 165.1 cm (5' 5\")  Ideal Body Weight (IBW): 125 lbs (57 kg)       Current Body Weight: 45.8 kg (100 lb 15.5 oz), 80.8 % IBW. Weight Source: Standing Scale  Current BMI (kg/m2): 16.8  Usual Body Weight: 47.6 kg (105 lb)  % Weight Change (Calculated): -3.8  Weight Adjustment For: No Adjustment                 BMI Categories: Underweight (BMI less than 18.5)    Wt Readings from Last 10 Encounters:   10/11/24 45.8 kg (100 lb 15.5 oz)     Nutrition Diagnosis:   Moderate malnutrition, In context of acute illness or injury related to inadequate protein-energy intake, other (comment) (alcohol intake) as evidenced by Criteria as identified in malnutrition assessment    Nutrition Interventions:   Food and/or Nutrient Delivery: Continue Current Diet, Start Oral Nutrition Supplement  Nutrition Education/Counseling: Education completed  Coordination of Nutrition Care: Continue to monitor while inpatient       Goals:     Goals: PO intake 50% or greater, by next RD assessment       Nutrition Monitoring and Evaluation:   Behavioral-Environmental

## 2024-10-14 NOTE — PROGRESS NOTES
[facility]      Subjective     No acute issues    Objective     Patient Vitals for the past 24 hrs:   Temp Pulse Resp BP SpO2   10/14/24 0855 -- -- -- 90/60 --   10/14/24 0845 -- -- -- (!) 74/52 --   10/14/24 0830 98.4 °F (36.9 °C) 97 18 (!) 82/54 97 %   10/14/24 0809 -- 96 -- -- --   10/14/24 0655 -- -- 16 -- --   10/14/24 0625 -- -- 18 -- --   10/14/24 0620 -- 100 -- 105/61 --   10/14/24 0013 -- 91 -- 101/66 --   10/13/24 2150 -- -- 18 -- --   10/13/24 2120 -- -- 19 -- --   10/13/24 2119 98.4 °F (36.9 °C) (!) 105 20 104/65 --   10/13/24 1439 99 °F (37.2 °C) 98 18 101/70 --   10/13/24 1110 -- -- 14 -- --         Intake/Output Summary (Last 24 hours) at 10/14/2024 1043  Last data filed at 10/14/2024 0809  Gross per 24 hour   Intake 642.22 ml   Output --   Net 642.22 ml        PE  Pulm - CTAB  CV - RRR  Abd - soft, ND, BS present, minimal TTP    Labs  Lab Results   Component Value Date/Time     10/14/2024 06:25 AM    K 4.0 10/14/2024 06:25 AM     10/14/2024 06:25 AM    CO2 24 10/14/2024 06:25 AM    BUN 11 10/14/2024 06:25 AM    CREATININE 0.66 10/14/2024 06:25 AM    GLUCOSE 78 10/14/2024 06:25 AM    CALCIUM 8.4 10/14/2024 06:25 AM    LABGLOM >90 10/14/2024 06:25 AM      Lab Results   Component Value Date    WBC 8.2 10/14/2024    HGB 5.3 (LL) 10/14/2024    HCT 16.6 (LL) 10/14/2024    .7 (H) 10/14/2024     (L) 10/14/2024     Lab Results   Component Value Date    ALT 13 10/14/2024     (H) 10/14/2024    ALKPHOS 323 (H) 10/14/2024    BILITOT 5.9 (H) 10/14/2024         Assessment     Kristie Rowan is a 39 y.o.yr old female with acute hepatitis    Plan     HIDA scan planned for today to completely rule out the GB and cholecystitis  MRCP does not show evidence for cholecystitis  Paracentesis to be done today  If HIDA appears ok then Gen Surg will sign off    Adis Brizuela MD

## 2024-10-14 NOTE — CONSENT
Informed Consent for Blood Component Transfusion Note    I have discussed with the patient the rationale for blood component transfusion; its benefits in treating or preventing fatigue, organ damage, or death; and its risk which includes mild transfusion reactions, rare risk of blood borne infection, or more serious but rare reactions. I have discussed the alternatives to transfusion, including the risk and consequences of not receiving transfusion. The patient had an opportunity to ask questions and had agreed to proceed with transfusion of blood components.    Electronically signed by KONRAD Oliveros CNP on 10/14/24 at 9:43 AM EDT

## 2024-10-14 NOTE — PROGRESS NOTES
Product Expiration Date [id number]     Crossmatch Result Compatible      MRCP (10/11)    IMPRESSION:     1. Enlarged liver with geographic areas of hepatic steatosis and possible early  fibrotic changes.     2. Mild sludge in the gallbladder. No significant gallbladder wall thickening is  noted. No biliary dilatation.    Abd ultrasound (10/12/24)    IMPRESSION:     Hepatic steatosis and cirrhosis with ascites and portal hypertension including  reversal of the right hepatic flow  Concern for possible portal venous thrombus, although not detailed on the recent  MRI.        Assessment:     Elevated liver enzymes: History of significant alcohol consumption and dependency. Labs on hospital presentation , ALT 20, , TBILI 10.5 mg, WBC 19.1, HB 8.8, , INR 1.2. CT abd/pelvis and ultrasound as above. Suspect secondary to alcohol hepatitis-mild per hepatology not requiring steroids   Ascites: S/p ultrasound guided paracentesis, trace ascites, paracentesis not performed   Anemia: Hgb 5.3 <- 4.5, receiving 1 unit of PRBC currently. No overt signs of GI bleeding. No anticoagulation.      Patient Active Problem List   Diagnosis    Jaundice    Alcohol use disorder, moderate, dependence (HCC)    Alcoholic hepatitis with ascites    Ascites due to alcoholic hepatitis    Hyponatremia     Plan:     Diet as tolerated  Supportive measures  Surgery following, MRCP negative, awaiting HIDA scan   Hepatology following   Continue to monitor hemoglobin and for overt signs of GI bleeding. Can next consider procedures based on patient's clinical course.   Patient was discussed with Dr. Mead      Signed By: Sheila Mello PA-C     10/14/2024  2:22 PM       GI attending note:    Vitals, labs, and imaging reviewed. Agree with the history, physical, and plan of the HELEN. Please call with any questions or concerns.  Severe macrocytic anemia.  No signs of active GI bleed.  No endoscopic procedures at this time.

## 2024-10-15 LAB
ABO + RH BLD: NORMAL
ALBUMIN SERPL-MCNC: 2.7 G/DL (ref 3.5–5)
ALBUMIN/GLOB SERPL: 0.8 (ref 1.1–2.2)
ALP SERPL-CCNC: 355 U/L (ref 45–117)
ALT SERPL-CCNC: 16 U/L (ref 12–78)
ANION GAP SERPL CALC-SCNC: 4 MMOL/L (ref 2–12)
AST SERPL-CCNC: 114 U/L (ref 15–37)
BILIRUB SERPL-MCNC: 10.7 MG/DL (ref 0.2–1)
BLD PROD TYP BPU: NORMAL
BLD PROD TYP BPU: NORMAL
BLOOD BANK BLOOD PRODUCT EXPIRATION DATE: NORMAL
BLOOD BANK BLOOD PRODUCT EXPIRATION DATE: NORMAL
BLOOD BANK DISPENSE STATUS: NORMAL
BLOOD BANK DISPENSE STATUS: NORMAL
BLOOD BANK ISBT PRODUCT BLOOD TYPE: 5100
BLOOD BANK ISBT PRODUCT BLOOD TYPE: 5100
BLOOD BANK PRODUCT CODE: NORMAL
BLOOD BANK PRODUCT CODE: NORMAL
BLOOD BANK UNIT TYPE AND RH: NORMAL
BLOOD BANK UNIT TYPE AND RH: NORMAL
BLOOD GROUP ANTIBODIES SERPL: NORMAL
BPU ID: NORMAL
BPU ID: NORMAL
BUN SERPL-MCNC: 10 MG/DL (ref 6–20)
BUN/CREAT SERPL: 14 (ref 12–20)
CALCIUM SERPL-MCNC: 8.7 MG/DL (ref 8.5–10.1)
CHLORIDE SERPL-SCNC: 106 MMOL/L (ref 97–108)
CO2 SERPL-SCNC: 25 MMOL/L (ref 21–32)
COMMENT:: NORMAL
CREAT SERPL-MCNC: 0.7 MG/DL (ref 0.55–1.02)
CROSSMATCH RESULT: NORMAL
CROSSMATCH RESULT: NORMAL
ERYTHROCYTE [DISTWIDTH] IN BLOOD BY AUTOMATED COUNT: ABNORMAL % (ref 11.5–14.5)
GLOBULIN SER CALC-MCNC: 3.4 G/DL (ref 2–4)
GLUCOSE BLD STRIP.AUTO-MCNC: 118 MG/DL (ref 65–117)
GLUCOSE SERPL-MCNC: 90 MG/DL (ref 65–100)
HCT VFR BLD AUTO: 25.9 % (ref 35–47)
HCT VFR BLD AUTO: 27 % (ref 35–47)
HCV GENTYP SERPL NAA+PROBE: NORMAL
HCV RNA SERPL NAA+PROBE-ACNC: NORMAL IU/ML
HCV RNA SERPL NAA+PROBE-LOG IU: NORMAL LOG10 IU/ML
HGB BLD-MCNC: 8.4 G/DL (ref 11.5–16)
HGB BLD-MCNC: 8.7 G/DL (ref 11.5–16)
INR PPP: 1.3 (ref 0.9–1.1)
LABORATORY COMMENT REPORT: NORMAL
MCH RBC QN AUTO: 34.4 PG (ref 26–34)
MCHC RBC AUTO-ENTMCNC: 32.4 G/DL (ref 30–36.5)
MCV RBC AUTO: 106.1 FL (ref 80–99)
NRBC # BLD: 0.03 K/UL (ref 0–0.01)
NRBC BLD-RTO: 0.3 PER 100 WBC
PLATELET # BLD AUTO: 152 K/UL (ref 150–400)
PMV BLD AUTO: 9.7 FL (ref 8.9–12.9)
POTASSIUM SERPL-SCNC: 3.5 MMOL/L (ref 3.5–5.1)
PROT SERPL-MCNC: 6.1 G/DL (ref 6.4–8.2)
PROTHROMBIN TIME: 13.7 SEC (ref 9–11.1)
RBC # BLD AUTO: 2.44 M/UL (ref 3.8–5.2)
SERVICE CMNT-IMP: ABNORMAL
SODIUM SERPL-SCNC: 135 MMOL/L (ref 136–145)
SPECIMEN EXP DATE BLD: NORMAL
SPECIMEN HOLD: NORMAL
UNIT DIVISION: 0
UNIT DIVISION: 0
UNIT ISSUE DATE/TIME: NORMAL
UNIT ISSUE DATE/TIME: NORMAL
WBC # BLD AUTO: 9.7 K/UL (ref 3.6–11)

## 2024-10-15 PROCEDURE — 85018 HEMOGLOBIN: CPT

## 2024-10-15 PROCEDURE — 99232 SBSQ HOSP IP/OBS MODERATE 35: CPT | Performed by: INTERNAL MEDICINE

## 2024-10-15 PROCEDURE — 99232 SBSQ HOSP IP/OBS MODERATE 35: CPT | Performed by: SURGERY

## 2024-10-15 PROCEDURE — 6360000002 HC RX W HCPCS: Performed by: PHYSICIAN ASSISTANT

## 2024-10-15 PROCEDURE — 36415 COLL VENOUS BLD VENIPUNCTURE: CPT

## 2024-10-15 PROCEDURE — 2580000003 HC RX 258: Performed by: FAMILY MEDICINE

## 2024-10-15 PROCEDURE — 80053 COMPREHEN METABOLIC PANEL: CPT

## 2024-10-15 PROCEDURE — 2580000003 HC RX 258: Performed by: NURSE PRACTITIONER

## 2024-10-15 PROCEDURE — 6370000000 HC RX 637 (ALT 250 FOR IP): Performed by: FAMILY MEDICINE

## 2024-10-15 PROCEDURE — 85027 COMPLETE CBC AUTOMATED: CPT

## 2024-10-15 PROCEDURE — 2580000003 HC RX 258: Performed by: PHYSICIAN ASSISTANT

## 2024-10-15 PROCEDURE — 6370000000 HC RX 637 (ALT 250 FOR IP): Performed by: NURSE PRACTITIONER

## 2024-10-15 PROCEDURE — 6360000002 HC RX W HCPCS: Performed by: NURSE PRACTITIONER

## 2024-10-15 PROCEDURE — 85610 PROTHROMBIN TIME: CPT

## 2024-10-15 PROCEDURE — 6360000002 HC RX W HCPCS: Performed by: FAMILY MEDICINE

## 2024-10-15 PROCEDURE — 1100000000 HC RM PRIVATE

## 2024-10-15 PROCEDURE — 85014 HEMATOCRIT: CPT

## 2024-10-15 PROCEDURE — 82962 GLUCOSE BLOOD TEST: CPT

## 2024-10-15 PROCEDURE — 6370000000 HC RX 637 (ALT 250 FOR IP): Performed by: INTERNAL MEDICINE

## 2024-10-15 RX ORDER — ALPRAZOLAM 0.25 MG/1
0.25 TABLET ORAL 3 TIMES DAILY PRN
Status: DISCONTINUED | OUTPATIENT
Start: 2024-10-15 | End: 2024-10-25 | Stop reason: HOSPADM

## 2024-10-15 RX ORDER — GABAPENTIN 100 MG/1
100 CAPSULE ORAL 3 TIMES DAILY
Status: DISCONTINUED | OUTPATIENT
Start: 2024-10-15 | End: 2024-10-25 | Stop reason: HOSPADM

## 2024-10-15 RX ADMIN — ONDANSETRON 4 MG: 2 INJECTION INTRAMUSCULAR; INTRAVENOUS at 03:24

## 2024-10-15 RX ADMIN — MORPHINE SULFATE 2 MG: 2 INJECTION, SOLUTION INTRAMUSCULAR; INTRAVENOUS at 11:34

## 2024-10-15 RX ADMIN — FOLIC ACID TAB 400 MCG 1 MG: 400 TAB at 08:16

## 2024-10-15 RX ADMIN — PIPERACILLIN AND TAZOBACTAM 3375 MG: 3; .375 INJECTION, POWDER, LYOPHILIZED, FOR SOLUTION INTRAVENOUS at 07:29

## 2024-10-15 RX ADMIN — GABAPENTIN 100 MG: 100 CAPSULE ORAL at 20:12

## 2024-10-15 RX ADMIN — GABAPENTIN 100 MG: 100 CAPSULE ORAL at 14:02

## 2024-10-15 RX ADMIN — MORPHINE SULFATE 2 MG: 2 INJECTION, SOLUTION INTRAMUSCULAR; INTRAVENOUS at 07:40

## 2024-10-15 RX ADMIN — MORPHINE SULFATE 2 MG: 2 INJECTION, SOLUTION INTRAMUSCULAR; INTRAVENOUS at 17:02

## 2024-10-15 RX ADMIN — FUROSEMIDE 20 MG: 20 TABLET ORAL at 08:16

## 2024-10-15 RX ADMIN — PANTOPRAZOLE SODIUM 40 MG: 40 INJECTION, POWDER, FOR SOLUTION INTRAVENOUS at 20:12

## 2024-10-15 RX ADMIN — GABAPENTIN 100 MG: 100 CAPSULE ORAL at 09:21

## 2024-10-15 RX ADMIN — PANTOPRAZOLE SODIUM 40 MG: 40 INJECTION, POWDER, FOR SOLUTION INTRAVENOUS at 08:16

## 2024-10-15 RX ADMIN — MORPHINE SULFATE 2 MG: 2 INJECTION, SOLUTION INTRAMUSCULAR; INTRAVENOUS at 22:52

## 2024-10-15 RX ADMIN — ALPRAZOLAM 0.25 MG: 0.25 TABLET ORAL at 13:08

## 2024-10-15 RX ADMIN — ONDANSETRON 4 MG: 2 INJECTION INTRAMUSCULAR; INTRAVENOUS at 17:02

## 2024-10-15 RX ADMIN — THERA TABS 1 TABLET: TAB at 08:16

## 2024-10-15 RX ADMIN — SPIRONOLACTONE 50 MG: 25 TABLET ORAL at 08:27

## 2024-10-15 RX ADMIN — Medication 100 MG: at 08:16

## 2024-10-15 RX ADMIN — MORPHINE SULFATE 2 MG: 2 INJECTION, SOLUTION INTRAMUSCULAR; INTRAVENOUS at 03:24

## 2024-10-15 RX ADMIN — PIPERACILLIN AND TAZOBACTAM 3375 MG: 3; .375 INJECTION, POWDER, LYOPHILIZED, FOR SOLUTION INTRAVENOUS at 14:55

## 2024-10-15 RX ADMIN — SODIUM CHLORIDE, PRESERVATIVE FREE 10 ML: 5 INJECTION INTRAVENOUS at 20:13

## 2024-10-15 RX ADMIN — PIPERACILLIN AND TAZOBACTAM 3375 MG: 3; .375 INJECTION, POWDER, LYOPHILIZED, FOR SOLUTION INTRAVENOUS at 22:54

## 2024-10-15 RX ADMIN — Medication 10 ML: at 20:13

## 2024-10-15 ASSESSMENT — PAIN DESCRIPTION - DESCRIPTORS
DESCRIPTORS: ACHING

## 2024-10-15 ASSESSMENT — PAIN SCALES - GENERAL
PAINLEVEL_OUTOF10: 7
PAINLEVEL_OUTOF10: 8
PAINLEVEL_OUTOF10: 7
PAINLEVEL_OUTOF10: 8
PAINLEVEL_OUTOF10: 6
PAINLEVEL_OUTOF10: 8
PAINLEVEL_OUTOF10: 4
PAINLEVEL_OUTOF10: 6

## 2024-10-15 ASSESSMENT — PAIN SCALES - WONG BAKER
WONGBAKER_NUMERICALRESPONSE: NO HURT

## 2024-10-15 ASSESSMENT — PAIN DESCRIPTION - FREQUENCY
FREQUENCY: INTERMITTENT
FREQUENCY: INTERMITTENT

## 2024-10-15 ASSESSMENT — PAIN DESCRIPTION - LOCATION
LOCATION: ABDOMEN

## 2024-10-15 ASSESSMENT — PAIN DESCRIPTION - ORIENTATION
ORIENTATION: ANTERIOR
ORIENTATION: INNER
ORIENTATION: INNER
ORIENTATION: ANTERIOR

## 2024-10-15 NOTE — PROGRESS NOTES
[facility]   [address] [address]       GI PROGRESS NOTE  Will BUZZ Brannon  [phone] office      NAME: Kristie Rowan   :  1985   MRN:  [id number]       Subjective:   Patient reports persistent abdominal bloating and abdominal pain.  No nausea or vomiting.  She reports having a loose, black bowel movement today (reportedly black since admission).  No hematochezia.      Objective:     VITALS:   Last 24hrs VS reviewed since prior progress note. Most recent are:  Vitals:    10/15/24 0810   BP:    Pulse:    Resp: 16   Temp:    SpO2:      PHYSICAL EXAM:  General: Cooperative, no acute distress, + jaundice  Neurologic:  Alert and oriented X 3.  HEENT: EOMI, + scleral icterus   Lungs:  No acute respiratory distress  Heart:  S1 S2  Abdomen: Soft, mildly distended, no tenderness, no guarding, no rebound. +Bowel sounds.   Extremities: Warm  Psych:   Good insight. Not anxious or agitated.    Lab Data Reviewed:     Recent Results (from the past 24 hour(s))   Hemoglobin and Hematocrit    Collection Time: 10/14/24  6:30 PM   Result Value Ref Range    Hemoglobin 6.2 (L) 11.5 - 16.0 g/dL    Hematocrit 18.5 (L) 35.0 - 47.0 %   PREPARE RBC (CROSSMATCH), 1 Units    Collection Time: 10/14/24 11:30 PM   Result Value Ref Range    History Check Historical check performed    Protime-INR    Collection Time: 10/15/24  6:05 AM   Result Value Ref Range    INR 1.3 (H) 0.9 - 1.1      Protime 13.7 (H) 9.0 - 11.1 sec   Extra Tubes Hold    Collection Time: 10/15/24  6:05 AM   Result Value Ref Range    Specimen HOld 1LAV     Comment:        Add-on orders for these samples will be processed based on acceptable specimen integrity and analyte stability, which may vary by analyte.   Comprehensive Metabolic Panel    Collection Time: 10/15/24  8:57 AM   Result Value Ref Range    Sodium 135 (L) 136 - 145 mmol/L    Potassium 3.5 3.5 - 5.1 mmol/L    Chloride 106 97 - 108 mmol/L    CO2 25 21 - 32 mmol/L

## 2024-10-15 NOTE — PROGRESS NOTES
[facility]  Hospitalist Group                                                                                          Hospitalist Progress Note  KONRAD Oliveros - CNP  Office Phone: [phone]        Date of Service:  10/15/2024  NAME:  Kristie Rowan  :  1985  MRN:  [id number]       Admission Summary:   From HPI:  Kristie Rowan is a  39 y.o. female with apmhx past heroin abuse, and etoh dependence who presents with abdominal pain, n/v, and jaundice.  She was in her usual state of health until 1.5 weeks ago when she developed nausea, vomiting, abdominal pain and bloating.  She denies fever, chills, diarrhea, or dysuria.  She has a remote hx of IV heroin use, and has been sober for 4 years.  She states she tested negative for hep C 4 years ago.  She does drink 3 glasses of vodka or wine daily.  She denies hx withdrawal.      In the ED, she was tachycardic to 118.  Other VSS.  Lab showed WBC 19.1, macrocytic anemia with Hgb 8.8, Na 128, + 3.1, , Alk phos 599, T. Bili 10.5, and albumin 3.  US abdomen showed hepatomegaly with increased liver echogenicity and ascites, and gallbladder sludge with wall thickening an pericholecystic fluid.  MRCP was ordered, and is pending.     In the ED, she received morphine, toradol, and zofran       Interval history / Subjective:   Patient seen resting in bed in [facility].  Hgb improved today.  Patient having loose stools and still feeling very bloated with persistent upper abdominal pain.  Also complains of sharp 'pins and needles' pain in her legs and feet.       Assessment & Plan:     Abnormal LFT's : Alcohol hepatitis  Intractable abdominal pain  Jaundice  -, Alk phos 599, T. Bili 10.5 on admission  -US abdomen showed hepatomegaly with increased liver echogenicity, ascites, and gallbladder sludge with wall thickening , and pericholecystic fluid with no dilatation  -MRCP: Enlarged liver with geographic areas of hepatic steatosis  sodium chloride infusion   IntraVENous PRN    calcium carbonate (TUMS) chewable tablet 500 mg  500 mg Oral TID PRN    naloxone (NARCAN) injection 0.4 mg  0.4 mg IntraVENous PRN    piperacillin-tazobactam (ZOSYN) 3,375 mg in sodium chloride 0.9 % 50 mL IVPB (mini-bag)  3,375 mg IntraVENous Q8H    sodium chloride flush 0.9 % injection 5-40 mL  5-40 mL IntraVENous 2 times per day    sodium chloride flush 0.9 % injection 5-40 mL  5-40 mL IntraVENous PRN    0.9 % sodium chloride infusion   IntraVENous PRN    thiamine tablet 100 mg  100 mg Oral Daily    LORazepam (ATIVAN) tablet 1 mg  1 mg Oral Q1H PRN    Or    LORazepam (ATIVAN) injection 1 mg  1 mg IntraVENous Q1H PRN    Or    LORazepam (ATIVAN) tablet 2 mg  2 mg Oral Q1H PRN    Or    LORazepam (ATIVAN) injection 2 mg  2 mg IntraVENous Q1H PRN    Or    LORazepam (ATIVAN) tablet 3 mg  3 mg Oral Q1H PRN    Or    LORazepam (ATIVAN) injection 3 mg  3 mg IntraVENous Q1H PRN    Or    LORazepam (ATIVAN) tablet 4 mg  4 mg Oral Q1H PRN    Or    LORazepam (ATIVAN) injection 4 mg  4 mg IntraVENous Q1H PRN    multivitamin 1 tablet  1 tablet Oral Daily    folic acid (FOLVITE) tablet 1 mg  1 mg Oral Daily    morphine (PF) injection 2 mg  2 mg IntraVENous Q4H PRN    sodium chloride flush 0.9 % injection 5-40 mL  5-40 mL IntraVENous 2 times per day    0.9 % sodium chloride infusion   IntraVENous PRN    magnesium sulfate 2000 mg in 50 mL IVPB premix  2,000 mg IntraVENous PRN    ondansetron (ZOFRAN-ODT) disintegrating tablet 4 mg  4 mg Oral Q8H PRN    Or    ondansetron (ZOFRAN) injection 4 mg  4 mg IntraVENous Q6H PRN    polyethylene glycol (GLYCOLAX) packet 17 g  17 g Oral Daily PRN    acetaminophen (TYLENOL) tablet 650 mg  650 mg Oral Q6H PRN    Or    acetaminophen (TYLENOL) suppository 650 mg  650 mg Rectal Q6H PRN    sodium chloride flush 0.9 % injection 5-40 mL  5-40 mL IntraVENous BID     CRITICAL CARE ATTESTATION:  I had a face to face encounter with the patient, reviewed and

## 2024-10-15 NOTE — PROGRESS NOTES
[facility]      Subjective     Had packed red blood cells yesterday, still having some upper abdominal pain    Objective     Patient Vitals for the past 24 hrs:   Temp Pulse Resp BP SpO2   10/15/24 0810 -- -- 16 -- --   10/15/24 0755 98.4 °F (36.9 °C) 99 16 100/68 98 %   10/15/24 0740 -- -- 18 -- --   10/15/24 0354 -- -- 18 -- --   10/15/24 0324 -- -- 20 -- --   10/15/24 0259 98.2 °F (36.8 °C) 98 18 98/71 --   10/15/24 0008 98.5 °F (36.9 °C) (!) 108 20 111/71 100 %   10/14/24 2351 98.6 °F (37 °C) (!) 103 18 102/69 100 %   10/14/24 2348 98.6 °F (37 °C) (!) 107 18 102/69 100 %   10/14/24 2321 -- -- 18 102/66 --   10/14/24 2039 98.4 °F (36.9 °C) (!) 107 16 95/67 98 %   10/14/24 1824 -- -- 20 -- --   10/14/24 1645 98.4 °F (36.9 °C) (!) 101 16 90/61 97 %   10/14/24 1511 -- -- 18 -- --   10/14/24 1345 98.4 °F (36.9 °C) 97 20 (!) 84/61 97 %   10/14/24 1310 97.9 °F (36.6 °C) 93 18 91/61 --         Intake/Output Summary (Last 24 hours) at 10/15/2024 0926  Last data filed at 10/15/2024 0259  Gross per 24 hour   Intake 838.83 ml   Output --   Net 838.83 ml        PE  Pulm - CTAB  CV - RRR  Abd -soft, nondistended, bowel sounds present, mild tender to palpation epigastric    Labs  Lab Results   Component Value Date/Time     10/14/2024 06:25 AM    K 4.0 10/14/2024 06:25 AM     10/14/2024 06:25 AM    CO2 24 10/14/2024 06:25 AM    BUN 11 10/14/2024 06:25 AM    CREATININE 0.66 10/14/2024 06:25 AM    GLUCOSE 78 10/14/2024 06:25 AM    CALCIUM 8.4 10/14/2024 06:25 AM    LABGLOM >90 10/14/2024 06:25 AM      Lab Results   Component Value Date    WBC 8.2 10/14/2024    HGB 6.2 (L) 10/14/2024    HCT 18.5 (L) 10/14/2024    .7 (H) 10/14/2024     (L) 10/14/2024         Assessment     Kristie Rowan is a 39 y.o.yr old female with acute hepatitis    Plan     At this point her gallbladder appears to be ruled out as a source of pain.  It is likely that her hepatitis appears

## 2024-10-15 NOTE — PLAN OF CARE
Problem: Discharge Planning  Goal: Discharge to home or other facility with appropriate resources  10/15/2024 0849 by Lavinia Huerta RN  Outcome: Progressing  10/14/2024 2230 by Marisol Tobin RN  Outcome: Progressing     Problem: Pain  Goal: Verbalizes/displays adequate comfort level or baseline comfort level  10/15/2024 0849 by Lavinia Huerta RN  Outcome: Progressing  10/14/2024 2230 by Marisol Tobin, RN  Outcome: Progressing     Problem: Safety - Adult  Goal: Free from fall injury  10/15/2024 0849 by Lavinia Huerta, RN  Outcome: Progressing  10/14/2024 2230 by Marisol Tobin, RN  Outcome: Progressing     Problem: Nutrition Deficit:  Goal: Optimize nutritional status  10/15/2024 0849 by Lavinia Huerta RN  Outcome: Progressing  10/14/2024 2230 by Marisol Tobin, RN  Outcome: Progressing

## 2024-10-16 ENCOUNTER — ANESTHESIA EVENT (OUTPATIENT)
Facility: HOSPITAL | Age: 39
End: 2024-10-16
Payer: MEDICAID

## 2024-10-16 ENCOUNTER — ANESTHESIA (OUTPATIENT)
Facility: HOSPITAL | Age: 39
End: 2024-10-16
Payer: MEDICAID

## 2024-10-16 PROBLEM — K92.2 UPPER GI BLEEDING: Status: ACTIVE | Noted: 2024-10-16

## 2024-10-16 PROBLEM — D62 ACUTE BLOOD LOSS ANEMIA: Status: ACTIVE | Noted: 2024-10-16

## 2024-10-16 LAB
ALBUMIN SERPL-MCNC: 2 G/DL (ref 3.5–5)
ALBUMIN/GLOB SERPL: 0.6 (ref 1.1–2.2)
ALP SERPL-CCNC: 294 U/L (ref 45–117)
ALT SERPL-CCNC: 11 U/L (ref 12–78)
ANION GAP SERPL CALC-SCNC: 7 MMOL/L (ref 2–12)
AST SERPL-CCNC: 88 U/L (ref 15–37)
BILIRUB DIRECT SERPL-MCNC: 6.8 MG/DL (ref 0–0.2)
BILIRUB SERPL-MCNC: 7.9 MG/DL (ref 0.2–1)
BUN SERPL-MCNC: 9 MG/DL (ref 6–20)
BUN/CREAT SERPL: 15 (ref 12–20)
CALCIUM SERPL-MCNC: 8.3 MG/DL (ref 8.5–10.1)
CHLORIDE SERPL-SCNC: 107 MMOL/L (ref 97–108)
CO2 SERPL-SCNC: 24 MMOL/L (ref 21–32)
CREAT SERPL-MCNC: 0.62 MG/DL (ref 0.55–1.02)
ERYTHROCYTE [DISTWIDTH] IN BLOOD BY AUTOMATED COUNT: ABNORMAL % (ref 11.5–14.5)
GLOBULIN SER CALC-MCNC: 3.2 G/DL (ref 2–4)
GLUCOSE BLD STRIP.AUTO-MCNC: 78 MG/DL (ref 65–117)
GLUCOSE BLD STRIP.AUTO-MCNC: 78 MG/DL (ref 65–117)
GLUCOSE SERPL-MCNC: 74 MG/DL (ref 65–100)
HCT VFR BLD AUTO: 20.9 % (ref 35–47)
HCT VFR BLD AUTO: 25.4 % (ref 35–47)
HGB BLD-MCNC: 7 G/DL (ref 11.5–16)
HGB BLD-MCNC: 8 G/DL (ref 11.5–16)
INR PPP: 1.5 (ref 0.9–1.1)
INR PPP: 1.5 (ref 0.9–1.1)
MCH RBC QN AUTO: 34.8 PG (ref 26–34)
MCHC RBC AUTO-ENTMCNC: 33.5 G/DL (ref 30–36.5)
MCV RBC AUTO: 104 FL (ref 80–99)
NRBC # BLD: 0 K/UL (ref 0–0.01)
NRBC BLD-RTO: 0 PER 100 WBC
PLATELET # BLD AUTO: 136 K/UL (ref 150–400)
PMV BLD AUTO: 9.6 FL (ref 8.9–12.9)
POTASSIUM SERPL-SCNC: 2.8 MMOL/L (ref 3.5–5.1)
PROT SERPL-MCNC: 5.2 G/DL (ref 6.4–8.2)
PROTHROMBIN TIME: 14.9 SEC (ref 9–11.1)
PROTHROMBIN TIME: 15.1 SEC (ref 9–11.1)
RBC # BLD AUTO: 2.01 M/UL (ref 3.8–5.2)
SERVICE CMNT-IMP: NORMAL
SERVICE CMNT-IMP: NORMAL
SODIUM SERPL-SCNC: 138 MMOL/L (ref 136–145)
WBC # BLD AUTO: 9.5 K/UL (ref 3.6–11)

## 2024-10-16 PROCEDURE — 3600007512: Performed by: INTERNAL MEDICINE

## 2024-10-16 PROCEDURE — P9045 ALBUMIN (HUMAN), 5%, 250 ML: HCPCS | Performed by: INTERNAL MEDICINE

## 2024-10-16 PROCEDURE — 80048 BASIC METABOLIC PNL TOTAL CA: CPT

## 2024-10-16 PROCEDURE — 1100000000 HC RM PRIVATE

## 2024-10-16 PROCEDURE — 3600007502: Performed by: INTERNAL MEDICINE

## 2024-10-16 PROCEDURE — 0DB68ZX EXCISION OF STOMACH, VIA NATURAL OR ARTIFICIAL OPENING ENDOSCOPIC, DIAGNOSTIC: ICD-10-PCS | Performed by: INTERNAL MEDICINE

## 2024-10-16 PROCEDURE — 2580000003 HC RX 258: Performed by: PHYSICIAN ASSISTANT

## 2024-10-16 PROCEDURE — 2500000003 HC RX 250 WO HCPCS: Performed by: STUDENT IN AN ORGANIZED HEALTH CARE EDUCATION/TRAINING PROGRAM

## 2024-10-16 PROCEDURE — 2580000003 HC RX 258: Performed by: FAMILY MEDICINE

## 2024-10-16 PROCEDURE — 3700000000 HC ANESTHESIA ATTENDED CARE: Performed by: INTERNAL MEDICINE

## 2024-10-16 PROCEDURE — 36415 COLL VENOUS BLD VENIPUNCTURE: CPT

## 2024-10-16 PROCEDURE — 6360000002 HC RX W HCPCS: Performed by: FAMILY MEDICINE

## 2024-10-16 PROCEDURE — 82962 GLUCOSE BLOOD TEST: CPT

## 2024-10-16 PROCEDURE — 88342 IMHCHEM/IMCYTCHM 1ST ANTB: CPT

## 2024-10-16 PROCEDURE — 6360000002 HC RX W HCPCS: Performed by: NURSE PRACTITIONER

## 2024-10-16 PROCEDURE — 6360000002 HC RX W HCPCS: Performed by: STUDENT IN AN ORGANIZED HEALTH CARE EDUCATION/TRAINING PROGRAM

## 2024-10-16 PROCEDURE — 85027 COMPLETE CBC AUTOMATED: CPT

## 2024-10-16 PROCEDURE — 6370000000 HC RX 637 (ALT 250 FOR IP): Performed by: NURSE PRACTITIONER

## 2024-10-16 PROCEDURE — 80076 HEPATIC FUNCTION PANEL: CPT

## 2024-10-16 PROCEDURE — 3700000001 HC ADD 15 MINUTES (ANESTHESIA): Performed by: INTERNAL MEDICINE

## 2024-10-16 PROCEDURE — 7100000011 HC PHASE II RECOVERY - ADDTL 15 MIN: Performed by: INTERNAL MEDICINE

## 2024-10-16 PROCEDURE — 6360000002 HC RX W HCPCS: Performed by: INTERNAL MEDICINE

## 2024-10-16 PROCEDURE — 2720000010 HC SURG SUPPLY STERILE: Performed by: INTERNAL MEDICINE

## 2024-10-16 PROCEDURE — 2580000003 HC RX 258: Performed by: STUDENT IN AN ORGANIZED HEALTH CARE EDUCATION/TRAINING PROGRAM

## 2024-10-16 PROCEDURE — 85018 HEMOGLOBIN: CPT

## 2024-10-16 PROCEDURE — 6360000002 HC RX W HCPCS: Performed by: PHYSICIAN ASSISTANT

## 2024-10-16 PROCEDURE — 88305 TISSUE EXAM BY PATHOLOGIST: CPT

## 2024-10-16 PROCEDURE — 7100000010 HC PHASE II RECOVERY - FIRST 15 MIN: Performed by: INTERNAL MEDICINE

## 2024-10-16 PROCEDURE — 99233 SBSQ HOSP IP/OBS HIGH 50: CPT | Performed by: INTERNAL MEDICINE

## 2024-10-16 PROCEDURE — 85610 PROTHROMBIN TIME: CPT

## 2024-10-16 PROCEDURE — 2709999900 HC NON-CHARGEABLE SUPPLY: Performed by: INTERNAL MEDICINE

## 2024-10-16 PROCEDURE — 85014 HEMATOCRIT: CPT

## 2024-10-16 RX ORDER — SODIUM CHLORIDE 9 MG/ML
INJECTION, SOLUTION INTRAVENOUS PRN
Status: DISCONTINUED | OUTPATIENT
Start: 2024-10-16 | End: 2024-10-16 | Stop reason: HOSPADM

## 2024-10-16 RX ORDER — SODIUM CHLORIDE, SODIUM LACTATE, POTASSIUM CHLORIDE, CALCIUM CHLORIDE 600; 310; 30; 20 MG/100ML; MG/100ML; MG/100ML; MG/100ML
INJECTION, SOLUTION INTRAVENOUS
Status: DISCONTINUED | OUTPATIENT
Start: 2024-10-16 | End: 2024-10-16 | Stop reason: SDUPTHER

## 2024-10-16 RX ORDER — SODIUM CHLORIDE 0.9 % (FLUSH) 0.9 %
5-40 SYRINGE (ML) INJECTION PRN
Status: DISCONTINUED | OUTPATIENT
Start: 2024-10-16 | End: 2024-10-16 | Stop reason: HOSPADM

## 2024-10-16 RX ORDER — POTASSIUM CHLORIDE 750 MG/1
40 TABLET, EXTENDED RELEASE ORAL ONCE
Status: COMPLETED | OUTPATIENT
Start: 2024-10-16 | End: 2024-10-16

## 2024-10-16 RX ORDER — PHENYLEPHRINE HCL IN 0.9% NACL 0.4MG/10ML
SYRINGE (ML) INTRAVENOUS
Status: DISCONTINUED | OUTPATIENT
Start: 2024-10-16 | End: 2024-10-16 | Stop reason: SDUPTHER

## 2024-10-16 RX ORDER — ALBUMIN, HUMAN INJ 5% 5 %
12.5 SOLUTION INTRAVENOUS ONCE
Status: COMPLETED | OUTPATIENT
Start: 2024-10-16 | End: 2024-10-16

## 2024-10-16 RX ORDER — LIDOCAINE HYDROCHLORIDE 20 MG/ML
INJECTION, SOLUTION EPIDURAL; INFILTRATION; INTRACAUDAL; PERINEURAL
Status: DISCONTINUED | OUTPATIENT
Start: 2024-10-16 | End: 2024-10-16 | Stop reason: SDUPTHER

## 2024-10-16 RX ORDER — SODIUM CHLORIDE 0.9 % (FLUSH) 0.9 %
5-40 SYRINGE (ML) INJECTION EVERY 12 HOURS SCHEDULED
Status: DISCONTINUED | OUTPATIENT
Start: 2024-10-16 | End: 2024-10-16 | Stop reason: HOSPADM

## 2024-10-16 RX ORDER — POTASSIUM CHLORIDE 7.45 MG/ML
10 INJECTION INTRAVENOUS
Status: COMPLETED | OUTPATIENT
Start: 2024-10-16 | End: 2024-10-16

## 2024-10-16 RX ORDER — SODIUM CHLORIDE 9 MG/ML
INJECTION, SOLUTION INTRAVENOUS CONTINUOUS
Status: DISCONTINUED | OUTPATIENT
Start: 2024-10-16 | End: 2024-10-16 | Stop reason: HOSPADM

## 2024-10-16 RX ADMIN — SODIUM CHLORIDE, PRESERVATIVE FREE 10 ML: 5 INJECTION INTRAVENOUS at 21:59

## 2024-10-16 RX ADMIN — POTASSIUM CHLORIDE 10 MEQ: 10 INJECTION, SOLUTION INTRAVENOUS at 09:10

## 2024-10-16 RX ADMIN — MORPHINE SULFATE 2 MG: 2 INJECTION, SOLUTION INTRAMUSCULAR; INTRAVENOUS at 05:54

## 2024-10-16 RX ADMIN — MORPHINE SULFATE 2 MG: 2 INJECTION, SOLUTION INTRAMUSCULAR; INTRAVENOUS at 21:58

## 2024-10-16 RX ADMIN — MORPHINE SULFATE 2 MG: 2 INJECTION, SOLUTION INTRAMUSCULAR; INTRAVENOUS at 10:55

## 2024-10-16 RX ADMIN — ALBUMIN (HUMAN) 12.5 G: 12.5 INJECTION, SOLUTION INTRAVENOUS at 16:00

## 2024-10-16 RX ADMIN — PANTOPRAZOLE SODIUM 40 MG: 40 INJECTION, POWDER, FOR SOLUTION INTRAVENOUS at 21:58

## 2024-10-16 RX ADMIN — PIPERACILLIN AND TAZOBACTAM 3375 MG: 3; .375 INJECTION, POWDER, LYOPHILIZED, FOR SOLUTION INTRAVENOUS at 06:13

## 2024-10-16 RX ADMIN — PROPOFOL 50 MG: 10 INJECTION, EMULSION INTRAVENOUS at 15:27

## 2024-10-16 RX ADMIN — MORPHINE SULFATE 2 MG: 2 INJECTION, SOLUTION INTRAMUSCULAR; INTRAVENOUS at 17:49

## 2024-10-16 RX ADMIN — GABAPENTIN 100 MG: 100 CAPSULE ORAL at 08:10

## 2024-10-16 RX ADMIN — PROPOFOL 150 MG: 10 INJECTION, EMULSION INTRAVENOUS at 15:24

## 2024-10-16 RX ADMIN — GABAPENTIN 100 MG: 100 CAPSULE ORAL at 21:58

## 2024-10-16 RX ADMIN — POTASSIUM CHLORIDE 10 MEQ: 10 INJECTION, SOLUTION INTRAVENOUS at 09:55

## 2024-10-16 RX ADMIN — PANTOPRAZOLE SODIUM 40 MG: 40 INJECTION, POWDER, FOR SOLUTION INTRAVENOUS at 08:16

## 2024-10-16 RX ADMIN — Medication 80 MCG: at 15:30

## 2024-10-16 RX ADMIN — POTASSIUM CHLORIDE 40 MEQ: 750 TABLET, EXTENDED RELEASE ORAL at 09:04

## 2024-10-16 RX ADMIN — ONDANSETRON 4 MG: 2 INJECTION INTRAMUSCULAR; INTRAVENOUS at 21:58

## 2024-10-16 RX ADMIN — PROPOFOL 50 MG: 10 INJECTION, EMULSION INTRAVENOUS at 15:30

## 2024-10-16 RX ADMIN — PROPOFOL 50 MG: 10 INJECTION, EMULSION INTRAVENOUS at 15:33

## 2024-10-16 RX ADMIN — LIDOCAINE HYDROCHLORIDE 40 MG: 20 INJECTION, SOLUTION EPIDURAL; INFILTRATION; INTRACAUDAL; PERINEURAL at 15:24

## 2024-10-16 RX ADMIN — POTASSIUM CHLORIDE 10 MEQ: 10 INJECTION, SOLUTION INTRAVENOUS at 10:57

## 2024-10-16 RX ADMIN — PIPERACILLIN AND TAZOBACTAM 3375 MG: 3; .375 INJECTION, POWDER, LYOPHILIZED, FOR SOLUTION INTRAVENOUS at 22:54

## 2024-10-16 RX ADMIN — POTASSIUM CHLORIDE 10 MEQ: 10 INJECTION, SOLUTION INTRAVENOUS at 12:01

## 2024-10-16 RX ADMIN — PIPERACILLIN AND TAZOBACTAM 3375 MG: 3; .375 INJECTION, POWDER, LYOPHILIZED, FOR SOLUTION INTRAVENOUS at 16:44

## 2024-10-16 RX ADMIN — SODIUM CHLORIDE, POTASSIUM CHLORIDE, SODIUM LACTATE AND CALCIUM CHLORIDE: 600; 310; 30; 20 INJECTION, SOLUTION INTRAVENOUS at 15:15

## 2024-10-16 RX ADMIN — Medication 80 MCG: at 15:33

## 2024-10-16 RX ADMIN — PROPOFOL 50 MG: 10 INJECTION, EMULSION INTRAVENOUS at 15:35

## 2024-10-16 ASSESSMENT — PAIN DESCRIPTION - ORIENTATION
ORIENTATION: ANTERIOR
ORIENTATION: ANTERIOR

## 2024-10-16 ASSESSMENT — PAIN DESCRIPTION - LOCATION
LOCATION: ABDOMEN

## 2024-10-16 ASSESSMENT — PAIN SCALES - WONG BAKER
WONGBAKER_NUMERICALRESPONSE: NO HURT

## 2024-10-16 ASSESSMENT — PAIN SCALES - GENERAL
PAINLEVEL_OUTOF10: 5
PAINLEVEL_OUTOF10: 8
PAINLEVEL_OUTOF10: 9
PAINLEVEL_OUTOF10: 7
PAINLEVEL_OUTOF10: 4
PAINLEVEL_OUTOF10: 4
PAINLEVEL_OUTOF10: 7

## 2024-10-16 ASSESSMENT — PAIN - FUNCTIONAL ASSESSMENT: PAIN_FUNCTIONAL_ASSESSMENT: 0-10

## 2024-10-16 ASSESSMENT — PAIN DESCRIPTION - DESCRIPTORS
DESCRIPTORS: ACHING

## 2024-10-16 NOTE — PROGRESS NOTES
[facility] LIVER [facility]      Akira Calvo MD, FACP, FACG, FAASLD      BUZZ James, [facility]   Sheeba Roberson, [facility]   Sobeida Emeka, [facility]  Gustavo Kurtz, [facility]   Kathi Tillman, [facility]   Elvia Coley, [facility]      Liver [facility]   [address], [address]   [place]  [address] [phone]   FAX: [phone]  [facility]   [address], [address]   [place]  [address] [phone]   FAX: [phone]       HEPATOLOGY PROGRESS NOTE  The patient is a 39 y.o. female who has consumed 4-5 alcohol drinks per day for many years.    She also has a h/o IVDA but stopped drug use in 2019.  She is currently on suboxone.  She takes a PPI for GERD    She came to the ED because of abdominal pain.      In the ED Laboratory studies were significant for:    Sna 128, Scr 0.68 mg, , ALT 20, , TBILI 10.5 mg, ALB 3.0 gm, WBC 19.1, HB 8.8 gms, , INR 1.2.    Imaging of the liver with Ultrasound and MRI demonstrated hepatic steatosis consistent with alcohol induced hepatitis, nodular surface to the liver suggesting cirrhosis, no significant bile duct dilation, no liver mass, ascites.    Hospital Course to date:  WAS protocol and mangement for alcohol withdrawal.  So far no withdrawal  HypoNA has already resolved.  No need for IV albumin    TBILI came down from 10 to 8 and is now back up to 10mg with bleeding  INR stable in 1.2-1.3 range.  Alcohol induced hepatitis is mild and DF remains <<32.  No need for steroids  The elevation in ALP is due to cholestatic form of alcoholic hepatitis    US shows only minimal, ascites.  NO paracentesis needed  Will start step 1/2 diuretics    She had melena and drop in HB from 7 to 4.5 gms.  NO hypotension.  She received 2 unit of blood.    Hepatology    Neurologic:  Alert and oriented.  Cranial nerves grossly intact.  No asterixis.      LABORATORY:   Latest Reference Range & Units 10/13/24 09:55 10/14/24 06:25 10/14/24 08:55 10/15/24 08:57   Sodium 136 - 145 mmol/L 134 (L) 138  135 (L)   Potassium 3.5 - 5.1 mmol/L 3.4 (L) 4.0  3.5   Chloride 97 - 108 mmol/L 100 106  106   CARBON DIOXIDE 21 - 32 mmol/L 27 24  25   BUN,BUNPL 6 - 20 MG/DL 13 11  10   Creatinine 0.55 - 1.02 MG/DL 0.90 0.66  0.70   Albumin 3.5 - 5.0 g/dL 2.4 (L) 1.7 (L)  2.7 (L)   Alkaline Phosphatase 45 - 117 U/L 456 (H) 323 (H)  355 (H)   ALT 12 - 78 U/L 17 13  16   AST 15 - 37 U/L 132 (H) 100 (H)  114 (H)   Total Bilirubin 0.2 - 1.0 MG/DL 8.3 (H) 5.9 (H)  10.7 (H)   WBC 3.6 - 11.0 K/uL 13.4 (H) 7.6 8.2 9.7   Hemoglobin Quant 11.5 - 16.0 g/dL 7.2 (L) 4.5 (LL) 5.3 (LL) 8.4 (L)   Platelet Count 150 - 400 K/uL 173 111 (L) 123 (L) 152   INR 0.9 - 1.1   1.3 (H) 1.4 (H)  1.3 (H)   (LL): Data is critically low  (L): Data is abnormally low  (H): Data is abnormally high      Akira Calvo MD  [address], [address]  [place]  [address] [phone]  [facility]

## 2024-10-16 NOTE — ANESTHESIA POSTPROCEDURE EVALUATION
Department of Anesthesiology  Postprocedure Note    Patient: Kristie Rowan  MRN: [id number]  YOB: 1985  Date of evaluation: 10/16/2024    Procedure Summary       Date: 10/16/24 Room / Location: [facility] ENDO 04 / [facility] ENDOSCOPY    Anesthesia Start: 1515 Anesthesia Stop: 1538    Procedure: ESOPHAGOGASTRODUODENOSCOPY (Upper GI Region) Diagnosis:       Melena      (Melena [K92.1])    Surgeons: Dani Mead MD Responsible Provider: Saulo Hernandez MD    Anesthesia Type: MAC ASA Status: 3            Anesthesia Type: MAC    Janneth Phase I: Janneth Score: 8    Janneth Phase II: Janneth Score: 10    Anesthesia Post Evaluation    Patient location during evaluation: PACU  Patient participation: complete - patient participated  Level of consciousness: awake  Pain score: 0  Airway patency: patent  Nausea & Vomiting: no nausea  Cardiovascular status: blood pressure returned to baseline and hemodynamically stable  Respiratory status: acceptable  Hydration status: stable  Pain management: adequate and satisfactory to patient    No notable events documented.

## 2024-10-16 NOTE — H&P
[address] [address]          Pre-procedure History and Physical       NAME:  Kristie Rowan   :   1985   MRN:   [id number]     CHIEF COMPLAINT/HPI: melena?, anemia    PMH:  No past medical history on file.    PSH:  No past surgical history on file.    Allergies:  No Known Allergies    Home Medications:  None       Hospital Medications:  Current Facility-Administered Medications   Medication Dose Route Frequency    0.9 % sodium chloride infusion   IntraVENous Continuous    sodium chloride flush 0.9 % injection 5-40 mL  5-40 mL IntraVENous 2 times per day    sodium chloride flush 0.9 % injection 5-40 mL  5-40 mL IntraVENous PRN    0.9 % sodium chloride infusion   IntraVENous PRN    gabapentin (NEURONTIN) capsule 100 mg  100 mg Oral TID    pantoprazole (PROTONIX) 40 mg in sodium chloride (PF) 0.9 % 10 mL injection  40 mg IntraVENous Q12H    ALPRAZolam (XANAX) tablet 0.25 mg  0.25 mg Oral TID PRN    furosemide (LASIX) tablet 20 mg  20 mg Oral Daily    spironolactone (ALDACTONE) tablet 50 mg  50 mg Oral Daily    0.9 % sodium chloride infusion   IntraVENous PRN    simethicone (MYLICON) chewable tablet 80 mg  80 mg Oral Q6H PRN    0.9 % sodium chloride infusion   IntraVENous PRN    calcium carbonate (TUMS) chewable tablet 500 mg  500 mg Oral TID PRN    naloxone (NARCAN) injection 0.4 mg  0.4 mg IntraVENous PRN    piperacillin-tazobactam (ZOSYN) 3,375 mg in sodium chloride 0.9 % 50 mL IVPB (mini-bag)  3,375 mg IntraVENous Q8H    sodium chloride flush 0.9 % injection 5-40 mL  5-40 mL IntraVENous 2 times per day    sodium chloride flush 0.9 % injection 5-40 mL  5-40 mL IntraVENous PRN    0.9 % sodium chloride infusion   IntraVENous PRN    thiamine tablet 100 mg  100 mg Oral Daily    LORazepam (ATIVAN) tablet 1 mg  1 mg Oral Q1H PRN    Or    LORazepam (ATIVAN) injection 1 mg  1 mg IntraVENous Q1H PRN    Or    LORazepam (ATIVAN) tablet 2 mg  2 mg Oral Q1H PRN    Or

## 2024-10-16 NOTE — PROGRESS NOTES
Hospitalist Progress Note  KONRAD Sharif NP  Answering service: [phone] OR [phone] from in house phone        Date of Service:  10/16/2024  NAME:  Kristie Rowan  :  1985  MRN:  [id number]      Admission Summary:   Per H&P Kristie Roawn is a  39 y.o. female with apmhx past heroin abuse, and etoh dependence who presents with abdominal pain, n/v, and jaundice.  She was in her usual state of health until 1.5 weeks ago when she developed nausea, vomiting, abdominal pain and bloating.  She denies fever, chills, diarrhea, or dysuria.  She has a remote hx of IV heroin use, and has been sober for 4 years.  She states she tested negative for hep C 4 years ago.  She does drink 3 glasses of vodka or wine daily.  She denies hx withdrawal.      In the ED, she was tachycardic to 118.  Other VSS.  Lab showed WBC 19.1, macrocytic anemia with Hgb 8.8, Na 128, + 3.1, , Alk phos 599, T. Bili 10.5, and albumin 3.  US abdomen showed hepatomegaly with increased liver echogenicity and ascites, and gallbladder sludge with wall thickening an pericholecystic fluid.  MRCP was ordered, and is pending.     In the ED, she received morphine, toradol, and zofran       Interval history / Subjective:   I saw the patient this morning on rounds.  Still with abdominal pain, neuropathic pain of the feet     Assessment & Plan:        Transaminitis: Alcohol hepatitis  Intractable abdominal pain  Jaundice  Liver ultrasound with cirrhosis, portal hypertension  Hepatology following  Per hepatology no need for steroids at this point  LFTs today with some improvements, AST down to 88, alk phos down to 294 and T. bili also improving 6.8  Madrey score today 26.3         Cholecystitis  Leukocytosis  Has been seen by general surgery, HIDA scan normal, cholecystitis ruled out, no need for cholecystectomy  Gastroenterology following, concern for

## 2024-10-16 NOTE — CARE COORDINATION
Transition of Care Plan:    RUR: 12%  Prior Level of Functioning: independent  Disposition: Home  If SNF or IPR: Date FOC offered:   Date FOC received:   Accepting facility:   Date authorization started with reference number:   Date authorization received and expires:   Follow up appointments: per physician recommendation   DME needed: N/A  Transportation at discharge: family  IM/IMM Medicare/ letter given: N/A  Caregiver Contact: Justin Rowan [phone]  Barriers to discharge: medical stability, EGD 10/16    CM reviewed chart, noted: Patient explained that she is not working at this point.  Patient gets medications at [facility] on [address] near [address] [facility] [address]. Patient indicated that she will not go to Rehab.  This was discussed with patient.  Patient does not have a history of ETOH rehab. Support was offered. Patient does not use DME in the home.     Hepatology, Gastro, Gen Surg, Hospitalist are following.     Per IDRs patient is scheduled to have an EGD today 10/16.    CM to follow for any discharge needs.     Elif Pfeiffer RN/CRM

## 2024-10-16 NOTE — PLAN OF CARE
Problem: Discharge Planning  Goal: Discharge to home or other facility with appropriate resources  10/15/2024 2015 by Marisol Tobin RN  Outcome: Progressing  10/15/2024 0849 by Lavinia Huerta RN  Outcome: Progressing     Problem: Pain  Goal: Verbalizes/displays adequate comfort level or baseline comfort level  10/15/2024 2015 by Marisol Tobin RN  Outcome: Progressing  10/15/2024 0849 by Lavinia Huerta, RN  Outcome: Progressing     Problem: Safety - Adult  Goal: Free from fall injury  10/15/2024 2015 by Marisol Tobin, RN  Outcome: Progressing  10/15/2024 0849 by Lavinia Huerta, RN  Outcome: Progressing     Problem: Nutrition Deficit:  Goal: Optimize nutritional status  10/15/2024 2015 by Marisol Tobin RN  Outcome: Progressing  10/15/2024 0849 by Lavinia Huerta, RN  Outcome: Progressing

## 2024-10-16 NOTE — PROGRESS NOTES
TRANSFER - IN REPORT: @ 1407    Verbal report received from Lavinia RN on Kristie Rowan  being received from [facility] for ordered procedure      Report consisted of patient's Situation, Background, Assessment and   Recommendations(SBAR).     Information from the following report(s) Nurse Handoff Report was reviewed with the receiving nurse.    Opportunity for questions and clarification was provided.      Assessment completed upon patient's arrival to unit and care assumed.     Verified patient name and date of birth, scheduled procedure, and informed consent. Reviewed general discharge instructions and  information.  Assessed patient. Awake, alert, and oriented per baseline. Vital signs stable (see vital sign flowsheet). Respiratory status within defined limits, abdomen soft and non tender. Skin with in defined limits.     Initial RN admission and assessment performed and documented in Endoscopy navigator.     Patient evaluated by anesthesia in pre-procedure holding.     All procedural vital signs, airway assessment, and level of consciousness information monitored and recorded by anesthesia staff on the anesthesia record.     Report received from CRNA post procedure.  Patient transported to recovery area by RN.    Endoscopy post procedure time out was performed and specimens were verified with physician.    Endoscope was pre-cleaned at bedside immediately following procedure by Doe.    TRANSFER - OUT REPORT: @ 1604    Verbal report given to Charge RN for 5S on Kristie Rowan  being transferred to [facility] for routine post-op       Report consisted of patient's Situation, Background, Assessment and   Recommendations(SBAR).     Information from the following report(s) Nurse Handoff Report was reviewed with the receiving nurse.    Patient has esophageal varices and portal hypertensive gastropathy with two gastric ulcers from which we took biopsies. Going upstairs with albumin and 500cc bag of NS open to gravity. Plan  to start low-sodium GI diet now.    Opportunity for questions and clarification was provided.      Patient transported with:  Tech

## 2024-10-16 NOTE — PLAN OF CARE
Problem: Discharge Planning  Goal: Discharge to home or other facility with appropriate resources  10/16/2024 0936 by Lavinia Huerta, RN  Outcome: Progressing     Problem: Pain  Goal: Verbalizes/displays adequate comfort level or baseline comfort level  10/16/2024 0936 by Lavinia Huerta, RN  Outcome: Progressing     Problem: Safety - Adult  Goal: Free from fall injury  10/16/2024 0936 by Lavinia Huerta, RN  Outcome: Progressing     Problem: Nutrition Deficit:  Goal: Optimize nutritional status  10/15/2024 2015 by Marisol Toibn, RN  Outcome: Progressing

## 2024-10-16 NOTE — PROGRESS NOTES
[facility] LIVER [facility]      Akira Calvo MD, FACP, FACG, FAASLD      BUZZ James, [facility]   Sheeba Obandowilliamonur, [facility]   Sobeida Emeka, [facility]  Gustavo Kurtz, [facility]   Kathi Tillman, [facility]   Elvia Brownon, [facility]      Liver [facility]   [address]   [place]  [address] [phone]   FAX: [phone]  [facility]   [address]   [place]  [address] [phone]   FAX: [phone]       HEPATOLOGY PROGRESS NOTE  The patient is a 39 y.o. female who has consumed 4-5 alcohol drinks per day for many years.    She also has a h/o IVDA but stopped drug use in 2019.  She is currently on suboxone.  She takes a PPI for GERD    She came to the ED because of abdominal pain.      In the ED Laboratory studies were significant for:    Sna 128, Scr 0.68 mg, , ALT 20, , TBILI 10.5 mg, ALB 3.0 gm, WBC 19.1, HB 8.8 gms, , INR 1.2.    Imaging of the liver with Ultrasound and MRI demonstrated hepatic steatosis consistent with alcohol induced hepatitis, nodular surface to the liver suggesting cirrhosis, no significant bile duct dilation, no liver mass, ascites.    Hospital Course to date:  WAS protocol and mangement for alcohol withdrawal.  So far no withdrawal  HypoNA has already resolved.  No need for IV albumin    TBILI came down from 10 to 8mg  INR is now up to 1.5  Alcohol induced hepatitis is still mild and DF remains <32.  No need for steroids  The elevation in ALP is due to cholestatic form of alcoholic hepatitis and coming down    US shows only minimal, ascites.  N paracentesis needed  Now on step 1/2 diuretics    She had melena and drop in HB from 7 to 4.5 gms.  NO hypotension.  She received 2 unit of blood.  EGD by Alyce demonstrated small esophageal

## 2024-10-16 NOTE — OP NOTE
[facility]  Dani Mead M.D.  [address] [id number] [phone]               Esophagogastroduodenoscopy (EGD) Procedure Note    NAME: Kristie Rowan  :  1985  MRN:  [id number]    Indications: Possible melena    : Dani Mead MD    Referring Provider:  No primary care provider on file.    Staff: Circulator: Nii Mcdonald RN  Endoscopy Technician: Doe Garcia    Prosthetic devices, grafts, tissues, transplant, or devices implanted: none    Medicine:  MAC anesthesia      Procedure Details:  After informed consent was obtained with all risks and benefits of the procedure explained and preprocedure exam completed, the patient was placed in the left lateral decubitus position.  Universal protocol for patient identification was performed and documented in the nursing notes.  Throughout the procedure, the patient's blood pressure was monitored at least every five minutes; pulse, and oxygen saturations were monitored continuously.  All vital signs were documented in the nursing notes.  The endoscope was inserted into the mouth and advanced under direct vision to second portion of the duodenum.  A careful inspection was made as the gastroscope was withdrawn, including a retroflexed view of the proximal stomach; findings and interventions are described below.      Findings:   Esophagus: Small varices without stigmata of recent hemorrhage  Stomach: 2 clean-based ulcers with greatest diameter of 15 mm in the pre-pyloric antrum status post biopsies, mild portal hypertensive gastropathy  Duodenum: normal    Interventions:    biopsy of stomach pre pyloric antrum    Specimens:   ID Type Source Tests Collected by Time Destination   1 : gastric ulcers biopsies Tissue Gastric SURGICAL PATHOLOGY Dani Mead MD 10/16/2024 1528         EBL: None          Complications:     No immediate complications        Impression:  -See above.  Gastric ulcers as likely

## 2024-10-16 NOTE — ANESTHESIA PRE PROCEDURE
Department of Anesthesiology  Preprocedure Note       Name:  Kristie Rowan   Age:  39 y.o.  :  1985                                          MRN:  [id number]         Date:  10/16/2024      Surgeon: Surgeon(s):  Dani Mead MD    Procedure: Procedure(s):  ESOPHAGOGASTRODUODENOSCOPY    Medications prior to admission:   Prior to Admission medications    Not on File       Current medications:    Current Facility-Administered Medications   Medication Dose Route Frequency Provider Last Rate Last Admin   • gabapentin (NEURONTIN) capsule 100 mg  100 mg Oral TID Yanni Cerna APRN - CNP   100 mg at 10/16/24 0810   • pantoprazole (PROTONIX) 40 mg in sodium chloride (PF) 0.9 % 10 mL injection  40 mg IntraVENous Q12H Nigel Brannon PA   40 mg at 10/16/24 0816   • ALPRAZolam (XANAX) tablet 0.25 mg  0.25 mg Oral TID PRN Yanni Cerna APRN - CNP   0.25 mg at 10/15/24 1308   • furosemide (LASIX) tablet 20 mg  20 mg Oral Daily Akira Calvo MD   20 mg at 10/15/24 0816   • spironolactone (ALDACTONE) tablet 50 mg  50 mg Oral Daily Akira Calvo MD   50 mg at 10/15/24 0827   • 0.9 % sodium chloride infusion   IntraVENous PRN Yanni Cerna APRN - CNP       • simethicone (MYLICON) chewable tablet 80 mg  80 mg Oral Q6H PRN Yanni Cerna APRN - CNP   80 mg at 10/14/24 1656   • 0.9 % sodium chloride infusion   IntraVENous PRN Silvia Ortiz APRN - NP       • calcium carbonate (TUMS) chewable tablet 500 mg  500 mg Oral TID PRN Yanni Cerna APRN - CNP   500 mg at 10/13/24 2123   • naloxone (NARCAN) injection 0.4 mg  0.4 mg IntraVENous PRN Sindi Harmon MD       • piperacillin-tazobactam (ZOSYN) 3,375 mg in sodium chloride 0.9 % 50 mL IVPB (mini-bag)  3,375 mg IntraVENous Q8H Sindi Harmon MD   Stopped at 10/16/24 1022   • sodium chloride flush 0.9 % injection 5-40 mL  5-40 mL IntraVENous 2 times per day Sindi Harmon MD   10 mL at 10/15/24 2013   • sodium chloride flush 0.9 %

## 2024-10-17 LAB
ALBUMIN SERPL-MCNC: 2.2 G/DL (ref 3.5–5)
ALBUMIN/GLOB SERPL: 0.8 (ref 1.1–2.2)
ALP SERPL-CCNC: 291 U/L (ref 45–117)
ALT SERPL-CCNC: 13 U/L (ref 12–78)
ANION GAP SERPL CALC-SCNC: 6 MMOL/L (ref 2–12)
AST SERPL-CCNC: 77 U/L (ref 15–37)
BACTERIA SPEC CULT: NORMAL
BACTERIA SPEC CULT: NORMAL
BASOPHILS # BLD: 0 K/UL (ref 0–0.1)
BASOPHILS NFR BLD: 0 % (ref 0–1)
BILIRUB SERPL-MCNC: 7.8 MG/DL (ref 0.2–1)
BUN SERPL-MCNC: 10 MG/DL (ref 6–20)
BUN/CREAT SERPL: 20 (ref 12–20)
CALCIUM SERPL-MCNC: 7.9 MG/DL (ref 8.5–10.1)
CHLORIDE SERPL-SCNC: 108 MMOL/L (ref 97–108)
CO2 SERPL-SCNC: 24 MMOL/L (ref 21–32)
CREAT SERPL-MCNC: 0.5 MG/DL (ref 0.55–1.02)
DIFFERENTIAL METHOD BLD: ABNORMAL
EOSINOPHIL # BLD: 0.1 K/UL (ref 0–0.4)
EOSINOPHIL NFR BLD: 1 % (ref 0–7)
ERYTHROCYTE [DISTWIDTH] IN BLOOD BY AUTOMATED COUNT: 29.1 % (ref 11.5–14.5)
GLOBULIN SER CALC-MCNC: 2.8 G/DL (ref 2–4)
GLUCOSE BLD STRIP.AUTO-MCNC: 101 MG/DL (ref 65–117)
GLUCOSE SERPL-MCNC: 71 MG/DL (ref 65–100)
HCT VFR BLD AUTO: 22 % (ref 35–47)
HGB BLD-MCNC: 7.2 G/DL (ref 11.5–16)
IMM GRANULOCYTES # BLD AUTO: 0.1 K/UL (ref 0–0.04)
IMM GRANULOCYTES NFR BLD AUTO: 1 % (ref 0–0.5)
INR PPP: 1.5 (ref 0.9–1.1)
LYMPHOCYTES # BLD: 0.7 K/UL (ref 0.8–3.5)
LYMPHOCYTES NFR BLD: 8 % (ref 12–49)
MCH RBC QN AUTO: 34.3 PG (ref 26–34)
MCHC RBC AUTO-ENTMCNC: 32.7 G/DL (ref 30–36.5)
MCV RBC AUTO: 104.8 FL (ref 80–99)
MONOCYTES # BLD: 0.8 K/UL (ref 0–1)
MONOCYTES NFR BLD: 9 % (ref 5–13)
NEUTS SEG # BLD: 6.7 K/UL (ref 1.8–8)
NEUTS SEG NFR BLD: 81 % (ref 32–75)
NRBC # BLD: 0 K/UL (ref 0–0.01)
NRBC BLD-RTO: 0 PER 100 WBC
PLATELET # BLD AUTO: 142 K/UL (ref 150–400)
PMV BLD AUTO: 10.2 FL (ref 8.9–12.9)
POTASSIUM SERPL-SCNC: 3 MMOL/L (ref 3.5–5.1)
PROT SERPL-MCNC: 5 G/DL (ref 6.4–8.2)
PROTHROMBIN TIME: 14.9 SEC (ref 9–11.1)
RBC # BLD AUTO: 2.1 M/UL (ref 3.8–5.2)
RBC MORPH BLD: ABNORMAL
SERVICE CMNT-IMP: NORMAL
SODIUM SERPL-SCNC: 138 MMOL/L (ref 136–145)
WBC # BLD AUTO: 8.4 K/UL (ref 3.6–11)

## 2024-10-17 PROCEDURE — 6360000002 HC RX W HCPCS: Performed by: FAMILY MEDICINE

## 2024-10-17 PROCEDURE — 2580000003 HC RX 258: Performed by: PHYSICIAN ASSISTANT

## 2024-10-17 PROCEDURE — 6370000000 HC RX 637 (ALT 250 FOR IP): Performed by: NURSE PRACTITIONER

## 2024-10-17 PROCEDURE — 80053 COMPREHEN METABOLIC PANEL: CPT

## 2024-10-17 PROCEDURE — 85610 PROTHROMBIN TIME: CPT

## 2024-10-17 PROCEDURE — 82962 GLUCOSE BLOOD TEST: CPT

## 2024-10-17 PROCEDURE — 2580000003 HC RX 258: Performed by: INTERNAL MEDICINE

## 2024-10-17 PROCEDURE — P9045 ALBUMIN (HUMAN), 5%, 250 ML: HCPCS | Performed by: NURSE PRACTITIONER

## 2024-10-17 PROCEDURE — 85025 COMPLETE CBC W/AUTO DIFF WBC: CPT

## 2024-10-17 PROCEDURE — 2580000003 HC RX 258: Performed by: FAMILY MEDICINE

## 2024-10-17 PROCEDURE — 6360000002 HC RX W HCPCS: Performed by: PHYSICIAN ASSISTANT

## 2024-10-17 PROCEDURE — 6360000002 HC RX W HCPCS: Performed by: NURSE PRACTITIONER

## 2024-10-17 PROCEDURE — 1100000000 HC RM PRIVATE

## 2024-10-17 PROCEDURE — 36415 COLL VENOUS BLD VENIPUNCTURE: CPT

## 2024-10-17 PROCEDURE — 6360000002 HC RX W HCPCS: Performed by: INTERNAL MEDICINE

## 2024-10-17 PROCEDURE — 6370000000 HC RX 637 (ALT 250 FOR IP): Performed by: FAMILY MEDICINE

## 2024-10-17 RX ORDER — POTASSIUM CHLORIDE 750 MG/1
40 TABLET, EXTENDED RELEASE ORAL EVERY 4 HOURS
Status: COMPLETED | OUTPATIENT
Start: 2024-10-17 | End: 2024-10-17

## 2024-10-17 RX ORDER — ALBUMIN, HUMAN INJ 5% 5 %
25 SOLUTION INTRAVENOUS ONCE
Status: COMPLETED | OUTPATIENT
Start: 2024-10-17 | End: 2024-10-17

## 2024-10-17 RX ORDER — OXYCODONE HYDROCHLORIDE 5 MG/1
5 TABLET ORAL EVERY 4 HOURS PRN
Status: DISCONTINUED | OUTPATIENT
Start: 2024-10-17 | End: 2024-10-25 | Stop reason: HOSPADM

## 2024-10-17 RX ADMIN — SODIUM CHLORIDE, PRESERVATIVE FREE 10 ML: 5 INJECTION INTRAVENOUS at 20:37

## 2024-10-17 RX ADMIN — GABAPENTIN 100 MG: 100 CAPSULE ORAL at 20:37

## 2024-10-17 RX ADMIN — FOLIC ACID TAB 400 MCG 1 MG: 400 TAB at 09:14

## 2024-10-17 RX ADMIN — PHYTONADIONE 10 MG: 10 INJECTION, EMULSION INTRAMUSCULAR; INTRAVENOUS; SUBCUTANEOUS at 09:20

## 2024-10-17 RX ADMIN — GABAPENTIN 100 MG: 100 CAPSULE ORAL at 09:14

## 2024-10-17 RX ADMIN — PANTOPRAZOLE SODIUM 40 MG: 40 INJECTION, POWDER, FOR SOLUTION INTRAVENOUS at 20:36

## 2024-10-17 RX ADMIN — PIPERACILLIN AND TAZOBACTAM 3375 MG: 3; .375 INJECTION, POWDER, LYOPHILIZED, FOR SOLUTION INTRAVENOUS at 23:44

## 2024-10-17 RX ADMIN — MORPHINE SULFATE 2 MG: 2 INJECTION, SOLUTION INTRAMUSCULAR; INTRAVENOUS at 06:51

## 2024-10-17 RX ADMIN — OXYCODONE 5 MG: 5 TABLET ORAL at 16:44

## 2024-10-17 RX ADMIN — POTASSIUM CHLORIDE 40 MEQ: 750 TABLET, EXTENDED RELEASE ORAL at 12:49

## 2024-10-17 RX ADMIN — Medication 100 MG: at 09:15

## 2024-10-17 RX ADMIN — ALBUMIN (HUMAN) 25 G: 12.5 INJECTION, SOLUTION INTRAVENOUS at 10:22

## 2024-10-17 RX ADMIN — THERA TABS 1 TABLET: TAB at 09:15

## 2024-10-17 RX ADMIN — GABAPENTIN 100 MG: 100 CAPSULE ORAL at 14:39

## 2024-10-17 RX ADMIN — PIPERACILLIN AND TAZOBACTAM 3375 MG: 3; .375 INJECTION, POWDER, LYOPHILIZED, FOR SOLUTION INTRAVENOUS at 14:42

## 2024-10-17 RX ADMIN — Medication 10 ML: at 09:24

## 2024-10-17 RX ADMIN — SODIUM CHLORIDE, PRESERVATIVE FREE 10 ML: 5 INJECTION INTRAVENOUS at 09:24

## 2024-10-17 RX ADMIN — PIPERACILLIN AND TAZOBACTAM 3375 MG: 3; .375 INJECTION, POWDER, LYOPHILIZED, FOR SOLUTION INTRAVENOUS at 07:01

## 2024-10-17 RX ADMIN — OXYCODONE 5 MG: 5 TABLET ORAL at 20:36

## 2024-10-17 RX ADMIN — OXYCODONE 5 MG: 5 TABLET ORAL at 11:57

## 2024-10-17 RX ADMIN — Medication 10 ML: at 20:37

## 2024-10-17 RX ADMIN — MORPHINE SULFATE 2 MG: 2 INJECTION, SOLUTION INTRAMUSCULAR; INTRAVENOUS at 02:59

## 2024-10-17 RX ADMIN — PANTOPRAZOLE SODIUM 40 MG: 40 INJECTION, POWDER, FOR SOLUTION INTRAVENOUS at 09:15

## 2024-10-17 RX ADMIN — POTASSIUM CHLORIDE 40 MEQ: 750 TABLET, EXTENDED RELEASE ORAL at 09:14

## 2024-10-17 ASSESSMENT — PAIN SCALES - WONG BAKER: WONGBAKER_NUMERICALRESPONSE: NO HURT

## 2024-10-17 ASSESSMENT — PAIN SCALES - GENERAL
PAINLEVEL_OUTOF10: 8
PAINLEVEL_OUTOF10: 8
PAINLEVEL_OUTOF10: 6
PAINLEVEL_OUTOF10: 6
PAINLEVEL_OUTOF10: 1
PAINLEVEL_OUTOF10: 8

## 2024-10-17 ASSESSMENT — PAIN DESCRIPTION - LOCATION
LOCATION: ABDOMEN

## 2024-10-17 ASSESSMENT — PAIN DESCRIPTION - DESCRIPTORS
DESCRIPTORS: ACHING;DISCOMFORT;DULL;BURNING
DESCRIPTORS: CRAMPING
DESCRIPTORS: SORE
DESCRIPTORS: ACHING;DISCOMFORT;DULL

## 2024-10-17 ASSESSMENT — PAIN DESCRIPTION - ORIENTATION
ORIENTATION: ANTERIOR
ORIENTATION: LEFT;RIGHT

## 2024-10-17 NOTE — CARE COORDINATION
Transition of Care Plan:    RUR: 12%  Prior Level of Functioning: independent  Disposition: Home  If SNF or IPR: Date FOC offered:   Date FOC received:   Accepting facility:   Date authorization started with reference number:   Date authorization received and expires:   Follow up appointments: per physician recommendation   DME needed: N/A  Transportation at discharge: family  IM/IMM Medicare/ letter given: N/A  Caregiver Contact: Justin Rowan [phone]  Barriers to discharge: medical stability, monitor labs    CM reviewed chart, noted: Patient explained that she is not working at this point.  Patient gets medications at [facility] on [address] near [facility]. Patient indicated that she will not go to Rehab.  This was discussed with patient.  Patient does not have a history of ETOH rehab. Support was offered. Patient does not use DME in the home.     Hepatology, Gastro, Gen Surg, Hospitalist are following.     Per attending note: likely dc Friday or Saturday pending INR and HGB.    CM to follow for any discharge needs.     Elif Pfeiffer RN/CRM

## 2024-10-17 NOTE — PLAN OF CARE
Problem: Discharge Planning  Goal: Discharge to home or other facility with appropriate resources  10/17/2024 1237 by Shannan Paniagua LPN  Outcome: Progressing  10/17/2024 1237 by Shannan Paniagua LPN  Outcome: Progressing

## 2024-10-17 NOTE — PROGRESS NOTES
Hospitalist Progress Note  KONRAD Sharif NP  Answering service: [phone] OR [phone] from in house phone        Date of Service:  10/17/2024  NAME:  Kristie Rowan  :  1985  MRN:  [id number]      Admission Summary:   Per H&P Kristie Rowan is a  39 y.o. female with apmhx past heroin abuse, and etoh dependence who presents with abdominal pain, n/v, and jaundice.  She was in her usual state of health until 1.5 weeks ago when she developed nausea, vomiting, abdominal pain and bloating.  She denies fever, chills, diarrhea, or dysuria.  She has a remote hx of IV heroin use, and has been sober for 4 years.  She states she tested negative for hep C 4 years ago.  She does drink 3 glasses of vodka or wine daily.  She denies hx withdrawal.      In the ED, she was tachycardic to 118.  Other VSS.  Lab showed WBC 19.1, macrocytic anemia with Hgb 8.8, Na 128, + 3.1, , Alk phos 599, T. Bili 10.5, and albumin 3.  US abdomen showed hepatomegaly with increased liver echogenicity and ascites, and gallbladder sludge with wall thickening an pericholecystic fluid.  MRCP was ordered, and is pending.     In the ED, she received morphine, toradol, and zofran       Interval history / Subjective:     I saw the patient this morning on rounds.  Still with abdominal pain.     Assessment & Plan:        Transaminitis: Alcohol hepatitis  Intractable abdominal pain  Jaundice  Liver ultrasound with cirrhosis, portal hypertension  Hepatology following  Per hepatology no need for steroids at this point  LFTs today with some improvements, AST down to 77, alk phos down to 291 and T. bili also improving 7.8  Madrey score today 25.3    Hypotension  BP low with systolic blood pressure 89  Furosemide and spironolactone have been held this morning  Will give a dose of albumin  Was hypotensive yesterday as well  Will consider adding  4 mg  4 mg IntraVENous Q6H PRN    polyethylene glycol (GLYCOLAX) packet 17 g  17 g Oral Daily PRN    acetaminophen (TYLENOL) tablet 650 mg  650 mg Oral Q6H PRN    Or    acetaminophen (TYLENOL) suppository 650 mg  650 mg Rectal Q6H PRN    sodium chloride flush 0.9 % injection 5-40 mL  5-40 mL IntraVENous BID     ______________________________________________________________________  EXPECTED LENGTH OF STAY: Unable to retrieve estimated LOS  ACTUAL LENGTH OF STAY:          6                 KONRAD Sharif NP

## 2024-10-18 LAB
ALBUMIN SERPL-MCNC: 2.4 G/DL (ref 3.5–5)
ALBUMIN/GLOB SERPL: 0.8 (ref 1.1–2.2)
ALP SERPL-CCNC: 268 U/L (ref 45–117)
ALT SERPL-CCNC: 15 U/L (ref 12–78)
ANION GAP SERPL CALC-SCNC: 5 MMOL/L (ref 2–12)
AST SERPL-CCNC: 78 U/L (ref 15–37)
BASOPHILS # BLD: 0 K/UL (ref 0–0.1)
BASOPHILS NFR BLD: 0 % (ref 0–1)
BILIRUB SERPL-MCNC: 8 MG/DL (ref 0.2–1)
BUN SERPL-MCNC: 10 MG/DL (ref 6–20)
BUN/CREAT SERPL: 21 (ref 12–20)
CALCIUM SERPL-MCNC: 8.1 MG/DL (ref 8.5–10.1)
CHLORIDE SERPL-SCNC: 109 MMOL/L (ref 97–108)
CO2 SERPL-SCNC: 23 MMOL/L (ref 21–32)
CREAT SERPL-MCNC: 0.48 MG/DL (ref 0.55–1.02)
DIFFERENTIAL METHOD BLD: ABNORMAL
EOSINOPHIL # BLD: 0.1 K/UL (ref 0–0.4)
EOSINOPHIL NFR BLD: 1 % (ref 0–7)
GLOBULIN SER CALC-MCNC: 2.9 G/DL (ref 2–4)
GLUCOSE SERPL-MCNC: 101 MG/DL (ref 65–100)
HCT VFR BLD AUTO: 21.4 % (ref 35–47)
HCT VFR BLD AUTO: 27.5 % (ref 35–47)
HGB BLD-MCNC: 6.9 G/DL (ref 11.5–16)
HGB BLD-MCNC: 8.8 G/DL (ref 11.5–16)
HISTORY CHECK: NORMAL
IMM GRANULOCYTES # BLD AUTO: 0.1 K/UL (ref 0–0.04)
IMM GRANULOCYTES NFR BLD AUTO: 1 % (ref 0–0.5)
INR PPP: 1.4 (ref 0.9–1.1)
LYMPHOCYTES # BLD: 0.8 K/UL (ref 0.8–3.5)
LYMPHOCYTES NFR BLD: 8 % (ref 12–49)
MCH RBC QN AUTO: 34.2 PG (ref 26–34)
MCHC RBC AUTO-ENTMCNC: 32.2 G/DL (ref 30–36.5)
MCV RBC AUTO: 105.9 FL (ref 80–99)
MONOCYTES # BLD: 0.9 K/UL (ref 0–1)
MONOCYTES NFR BLD: 10 % (ref 5–13)
NEUTS SEG # BLD: 7.5 K/UL (ref 1.8–8)
NEUTS SEG NFR BLD: 80 % (ref 32–75)
NRBC # BLD: 0 K/UL (ref 0–0.01)
NRBC BLD-RTO: 0 PER 100 WBC
PLATELET # BLD AUTO: 148 K/UL (ref 150–400)
PMV BLD AUTO: 10.1 FL (ref 8.9–12.9)
POTASSIUM SERPL-SCNC: 3.6 MMOL/L (ref 3.5–5.1)
PROT SERPL-MCNC: 5.3 G/DL (ref 6.4–8.2)
PROTHROMBIN TIME: 14.1 SEC (ref 9–11.1)
RBC # BLD AUTO: 2.02 M/UL (ref 3.8–5.2)
RBC MORPH BLD: ABNORMAL
SODIUM SERPL-SCNC: 137 MMOL/L (ref 136–145)
WBC # BLD AUTO: 9.4 K/UL (ref 3.6–11)

## 2024-10-18 PROCEDURE — 6370000000 HC RX 637 (ALT 250 FOR IP): Performed by: NURSE PRACTITIONER

## 2024-10-18 PROCEDURE — 2580000003 HC RX 258: Performed by: PHYSICIAN ASSISTANT

## 2024-10-18 PROCEDURE — 86900 BLOOD TYPING SEROLOGIC ABO: CPT

## 2024-10-18 PROCEDURE — 6360000002 HC RX W HCPCS: Performed by: PHYSICIAN ASSISTANT

## 2024-10-18 PROCEDURE — 85610 PROTHROMBIN TIME: CPT

## 2024-10-18 PROCEDURE — 86850 RBC ANTIBODY SCREEN: CPT

## 2024-10-18 PROCEDURE — 86901 BLOOD TYPING SEROLOGIC RH(D): CPT

## 2024-10-18 PROCEDURE — 2580000003 HC RX 258: Performed by: FAMILY MEDICINE

## 2024-10-18 PROCEDURE — 85025 COMPLETE CBC W/AUTO DIFF WBC: CPT

## 2024-10-18 PROCEDURE — 80053 COMPREHEN METABOLIC PANEL: CPT

## 2024-10-18 PROCEDURE — 36430 TRANSFUSION BLD/BLD COMPNT: CPT

## 2024-10-18 PROCEDURE — 36415 COLL VENOUS BLD VENIPUNCTURE: CPT

## 2024-10-18 PROCEDURE — 99231 SBSQ HOSP IP/OBS SF/LOW 25: CPT | Performed by: STUDENT IN AN ORGANIZED HEALTH CARE EDUCATION/TRAINING PROGRAM

## 2024-10-18 PROCEDURE — 6360000002 HC RX W HCPCS: Performed by: FAMILY MEDICINE

## 2024-10-18 PROCEDURE — 85018 HEMOGLOBIN: CPT

## 2024-10-18 PROCEDURE — 2580000003 HC RX 258: Performed by: INTERNAL MEDICINE

## 2024-10-18 PROCEDURE — 86923 COMPATIBILITY TEST ELECTRIC: CPT

## 2024-10-18 PROCEDURE — 85014 HEMATOCRIT: CPT

## 2024-10-18 PROCEDURE — 6370000000 HC RX 637 (ALT 250 FOR IP): Performed by: FAMILY MEDICINE

## 2024-10-18 PROCEDURE — 1100000000 HC RM PRIVATE

## 2024-10-18 PROCEDURE — 6360000002 HC RX W HCPCS: Performed by: INTERNAL MEDICINE

## 2024-10-18 PROCEDURE — P9016 RBC LEUKOCYTES REDUCED: HCPCS

## 2024-10-18 RX ORDER — SODIUM CHLORIDE 9 MG/ML
INJECTION, SOLUTION INTRAVENOUS PRN
Status: COMPLETED | OUTPATIENT
Start: 2024-10-18 | End: 2024-10-19

## 2024-10-18 RX ADMIN — PANTOPRAZOLE SODIUM 40 MG: 40 INJECTION, POWDER, FOR SOLUTION INTRAVENOUS at 09:19

## 2024-10-18 RX ADMIN — ONDANSETRON 4 MG: 2 INJECTION INTRAMUSCULAR; INTRAVENOUS at 09:13

## 2024-10-18 RX ADMIN — PIPERACILLIN AND TAZOBACTAM 3375 MG: 3; .375 INJECTION, POWDER, LYOPHILIZED, FOR SOLUTION INTRAVENOUS at 15:53

## 2024-10-18 RX ADMIN — FOLIC ACID TAB 400 MCG 1 MG: 400 TAB at 09:19

## 2024-10-18 RX ADMIN — PIPERACILLIN AND TAZOBACTAM 3375 MG: 3; .375 INJECTION, POWDER, LYOPHILIZED, FOR SOLUTION INTRAVENOUS at 06:36

## 2024-10-18 RX ADMIN — OXYCODONE 5 MG: 5 TABLET ORAL at 14:06

## 2024-10-18 RX ADMIN — GABAPENTIN 100 MG: 100 CAPSULE ORAL at 09:18

## 2024-10-18 RX ADMIN — OXYCODONE 5 MG: 5 TABLET ORAL at 23:56

## 2024-10-18 RX ADMIN — SODIUM CHLORIDE, PRESERVATIVE FREE 10 ML: 5 INJECTION INTRAVENOUS at 09:28

## 2024-10-18 RX ADMIN — GABAPENTIN 100 MG: 100 CAPSULE ORAL at 15:53

## 2024-10-18 RX ADMIN — SODIUM CHLORIDE, PRESERVATIVE FREE 10 ML: 5 INJECTION INTRAVENOUS at 20:19

## 2024-10-18 RX ADMIN — PIPERACILLIN AND TAZOBACTAM 3375 MG: 3; .375 INJECTION, POWDER, LYOPHILIZED, FOR SOLUTION INTRAVENOUS at 23:30

## 2024-10-18 RX ADMIN — OXYCODONE 5 MG: 5 TABLET ORAL at 09:19

## 2024-10-18 RX ADMIN — GABAPENTIN 100 MG: 100 CAPSULE ORAL at 20:18

## 2024-10-18 RX ADMIN — SODIUM CHLORIDE, PRESERVATIVE FREE 10 ML: 5 INJECTION INTRAVENOUS at 09:27

## 2024-10-18 RX ADMIN — PANTOPRAZOLE SODIUM 40 MG: 40 INJECTION, POWDER, FOR SOLUTION INTRAVENOUS at 20:19

## 2024-10-18 RX ADMIN — PHYTONADIONE 10 MG: 10 INJECTION, EMULSION INTRAMUSCULAR; INTRAVENOUS; SUBCUTANEOUS at 09:20

## 2024-10-18 RX ADMIN — OXYCODONE 5 MG: 5 TABLET ORAL at 00:56

## 2024-10-18 RX ADMIN — OXYCODONE 5 MG: 5 TABLET ORAL at 18:48

## 2024-10-18 RX ADMIN — Medication 10 ML: at 09:27

## 2024-10-18 RX ADMIN — THERA TABS 1 TABLET: TAB at 09:18

## 2024-10-18 RX ADMIN — Medication 100 MG: at 09:19

## 2024-10-18 RX ADMIN — OXYCODONE 5 MG: 5 TABLET ORAL at 04:38

## 2024-10-18 RX ADMIN — ACETAMINOPHEN 650 MG: 325 TABLET ORAL at 12:48

## 2024-10-18 ASSESSMENT — PAIN DESCRIPTION - ORIENTATION
ORIENTATION: ANTERIOR
ORIENTATION: RIGHT;LEFT
ORIENTATION: ANTERIOR
ORIENTATION: RIGHT;LEFT;DISTAL;INNER
ORIENTATION: ANTERIOR

## 2024-10-18 ASSESSMENT — PAIN SCALES - GENERAL
PAINLEVEL_OUTOF10: 0
PAINLEVEL_OUTOF10: 8
PAINLEVEL_OUTOF10: 9
PAINLEVEL_OUTOF10: 9
PAINLEVEL_OUTOF10: 7
PAINLEVEL_OUTOF10: 6
PAINLEVEL_OUTOF10: 0
PAINLEVEL_OUTOF10: 0
PAINLEVEL_OUTOF10: 7
PAINLEVEL_OUTOF10: 9
PAINLEVEL_OUTOF10: 8

## 2024-10-18 ASSESSMENT — PAIN DESCRIPTION - LOCATION
LOCATION: ABDOMEN

## 2024-10-18 ASSESSMENT — PAIN SCALES - WONG BAKER
WONGBAKER_NUMERICALRESPONSE: NO HURT

## 2024-10-18 ASSESSMENT — PAIN DESCRIPTION - DESCRIPTORS
DESCRIPTORS: ACHING;DISCOMFORT;DULL
DESCRIPTORS: ACHING;BURNING;CRAMPING
DESCRIPTORS: CRAMPING

## 2024-10-18 NOTE — PROGRESS NOTES
Comprehensive Nutrition Assessment    Type and Reason for Visit: Reassess    Nutrition Recommendations/Plan:   Continue regular, low sodium, GI bland diet  Ensure Plant Based ONS TID  Monitor weight, standing scale if possible  Continue current vitamin/mineral supplementation     Malnutrition Assessment:  Malnutrition Status:  Moderate malnutrition (10/14/24 1405)    Context:  Acute Illness     Findings of the 6 clinical characteristics of malnutrition:  Energy Intake:  50% or less of estimated energy requirements for 5 or more days  Weight Loss:  Unable to assess     Body Fat Loss:  Mild body fat loss Buccal region, Orbital   Muscle Mass Loss:  Mild muscle mass loss Temples (temporalis), Clavicles (pectoralis & deltoids)  Fluid Accumulation:  Unable to assess     Strength:  Not Performed     Nutrition Assessment:    40 yo female admitted for jaundice, presented w abd pain, N/V, jaundice, decreased appetite x 1-2 weeks. PMH of alcohol abuse, heroin abuse.    10/18: f/u. HIDA scan appeared to be normal, no need for cholecystectomy, general surgery signed off. GI following, concern for possible hematochezia. S/p EGD 10/16, findings include small varices in esophagus and 2 small gastric ulcers, which were likely to site of bleeding.  Spoke with pt at bedside, she reports really enjoying the Plant Based Ensures, re-ordered and adjusted to TID. She only received 2 earlier in the week and they were not re-ordered after NPO for procedure. Provided coupons as she wants to continue drinking them at home. She has been trying to eat 50% of the food, she does not eat much at baseline and she has been disliking the food, but she understands the importance of eating. Discharge pending stable Hgb and INR. RD to continue to monitor PO and ONS intake during admission.     10/14: RD evaluation for low BMI. Imaging showed hepatomegaly with ascites, gallbladder sludge and wall thickening. Pt with alcohol hepatitis. NPO for HIDA  Weight: 47.6 kg (105 lb)  % Weight Change (Calculated): -3.8  Weight Adjustment For: No Adjustment                 BMI Categories: Underweight (BMI less than 18.5)    Wt Readings from Last 10 Encounters:   10/11/24 45.8 kg (100 lb 15.5 oz)     Nutrition Diagnosis:   Moderate malnutrition, In context of acute illness or injury related to inadequate protein-energy intake, other (comment) (alcohol intake) as evidenced by Criteria as identified in malnutrition assessment    Nutrition Interventions:   Food and/or Nutrient Delivery: Continue Oral Nutrition Supplement, Continue Current Diet  Nutrition Education/Counseling: No recommendation at this time  Coordination of Nutrition Care: Continue to monitor while inpatient       Goals:  Previous Goal Met: Progressing toward Goal(s)  Goals: PO intake 50% or greater, by next RD assessment       Nutrition Monitoring and Evaluation:   Behavioral-Environmental Outcomes: Readiness for Change  Food/Nutrient Intake Outcomes: Food and Nutrient Intake, Supplement Intake  Physical Signs/Symptoms Outcomes: Biochemical Data, GI Status, Meal Time Behavior, Nutrition Focused Physical Findings, Weight    Discharge Planning:    Continue Oral Nutrition Supplement, Continue current diet     Cydney Brooks RD  Available via [employer]

## 2024-10-18 NOTE — PLAN OF CARE
Problem: Discharge Planning  Goal: Discharge to home or other facility with appropriate resources  10/17/2024 2236 by Levy Cleary, RN  Outcome: Progressing  Flowsheets (Taken 10/17/2024 2134)  Discharge to home or other facility with appropriate resources: Identify barriers to discharge with patient and caregiver  10/17/2024 1238 by Shannan Paniagua LPN  Outcome: Progressing  10/17/2024 1237 by Shannan Paniagua LPN  Outcome: Progressing  10/17/2024 1237 by Shannan Paniagua LPN  Outcome: Progressing     Problem: Pain  Goal: Verbalizes/displays adequate comfort level or baseline comfort level  Outcome: Progressing     Problem: Safety - Adult  Goal: Free from fall injury  Outcome: Progressing     Problem: Nutrition Deficit:  Goal: Optimize nutritional status  Outcome: Progressing

## 2024-10-18 NOTE — PROGRESS NOTES
[facility]  Hospitalist Group                                                                                          Hospitalist Progress Note  KONRAD Barkley - CNP  Answering service: [phone] OR [phone] from in house phone        Date of Service:  10/18/2024  NAME:  Kristie Rowan  :  1985  MRN:  [id number]       Admission Summary:    Kristie Rowan is a  39 y.o. female with apmhx past heroin abuse, and etoh dependence who presents with abdominal pain, n/v, and jaundice.  She was in her usual state of health until 1.5 weeks ago when she developed nausea, vomiting, abdominal pain and bloating.  She denies fever, chills, diarrhea, or dysuria.  She has a remote hx of IV heroin use, and has been sober for 4 years.  She states she tested negative for hep C 4 years ago.  She does drink 3 glasses of vodka or wine daily.  She denies hx withdrawal.      In the ED, she was tachycardic to 118.  Other VSS.  Lab showed WBC 19.1, macrocytic anemia with Hgb 8.8, Na 128, + 3.1, , Alk phos 599, T. Bili 10.5, and albumin 3.  US abdomen showed hepatomegaly with increased liver echogenicity and ascites, and gallbladder sludge with wall thickening an pericholecystic fluid.  MRCP was ordered, and is pending.     In the ED, she received morphine, toradol, and zofran       Interval history / Subjective:   10/18  Seen and examined on rounds, denies fever chest pain shortness of breath, attests to abdominal pain, generalized tenderness with gas pain     Assessment & Plan:      Alcohol Hepatitis with transaminitis  -MRI 10/11 showed: . Enlarged liver with geographic areas of hepatic steatosis and possible early fibrotic changes. Mild sludge in the gallbladder. No significant gallbladder wall thickening is noted. No biliary dilatation.  -RUQ u/s on 10/14 showed Small volume of ascites.  Paracentesis not performed.  -Hepatology following  -EGD done 10/16 Esophagus: Small varices without  Domain Source: IP Abuse Screening     Physical abuse: Denies     Verbal abuse: Denies     Emotional abuse: Not on file     Financial abuse: Not on file     Sexual abuse: Not on file   Utilities: Not on file       Review of Systems:   Pertinent items are noted in HPI.       Vital Signs:    Last 24hrs VS reviewed since prior progress note. Most recent are:  Vitals:    10/18/24 0919   BP:    Pulse:    Resp: 16   Temp:    SpO2:        No intake or output data in the 24 hours ending 10/18/24 1028     Physical Examination:     I had a face to face encounter with this patient and independently examined them on 10/18/2024 as outlined below:          General : alert x 3, awake, no acute distress,   HEENT: PEERL, EOMI, moist mucus membrane, TM clear  Neck: supple, no JVD, no meningeal signs  Chest: Clear to auscultation bilaterally   CVS: S1 S2 heard, Capillary refill less than 2 seconds  Abd: soft/ non tender, non distended, BS physiological,   Ext: no clubbing, no cyanosis, no edema, brisk 2+ DP pulses  Neuro/Psych: pleasant mood and affect, CN 2-12 grossly intact, sensory grossly within normal limit, Strength 5/5 in all extremities, DTR 1+ x 4  Skin: warm     Data Review:    Review and/or order of clinical lab test      I have personally and independently reviewed all pertinent labs, diagnostic studies, imaging, and have provided independent interpretation of the same.     Labs:     Recent Labs     10/17/24  0558 10/18/24  0533   WBC 8.4 9.4   HGB 7.2* 6.9*   HCT 22.0* 21.4*   * 148*     Recent Labs     10/16/24  0540 10/17/24  0558 10/18/24  0533    138 137   K 2.8* 3.0* 3.6    108 109*   CO2 24 24 23   BUN 9 10 10     Recent Labs     10/16/24  0540 10/17/24  0558 10/18/24  0533   ALT 11* 13 15   GLOB 3.2 2.8 2.9     Recent Labs     10/16/24  0932 10/17/24  0558 10/18/24  0533   INR 1.5* 1.5* 1.4*      No results for input(s): \"TIBC\" in the last 72 hours.    Invalid input(s): \"FE\", \"PSAT\", \"FERR\"   No

## 2024-10-18 NOTE — CONSULTS
[facility] LIVER [facility]      Akira Calvo MD, FACP, FACG, FAASLD      BUZZ James, [facility]   Sheeba Obandowilliamonur, [facility]   Sobeida Emeka, [facility]  Gustavo Kurtz, [facility]   Kathi Tillman, [facility]   Elvia Coley, [facility]      Liver [facility]   [address], [address]   [place]  [address] [phone]   FAX: [phone]  [facility]   [address], [address]   [place]  [address] [phone]   FAX: [phone]       HEPATOLOGY PROGRESS NOTE  The patient is a 39 y.o. female who has consumed 4-5 alcohol drinks per day for many years.    She also has a h/o IVDA but stopped drug use in 2019.  She is currently on suboxone.  She takes a PPI for GERD    She came to the ED because of abdominal pain.      In the ED Laboratory studies were significant for:    Sna 128, Scr 0.68 mg, , ALT 20, , TBILI 10.5 mg, ALB 3.0 gm, WBC 19.1, HB 8.8 gms, , INR 1.2.    Imaging of the liver with Ultrasound and MRI demonstrated hepatic steatosis consistent with alcohol induced hepatitis, nodular surface to the liver suggesting cirrhosis, no significant bile duct dilation, no liver mass, ascites.    Hospital Course to date:  CIWAS protocol and mangement for alcohol withdrawal.  So far no withdrawal  HypoNA has already resolved.  No need for IV albumin    TBILI came down from 10 to 8mg  INR stable with Vitamin K  Alcohol induced hepatitis is still mild and DF remains <32.  No need for steroids  The elevation in ALP is due to cholestatic form of alcoholic hepatitis and coming down    US shows only minimal, ascites.  N paracentesis needed  Now on step 1/2 diuretics    She had melena and drop in HB from 7 to 4.5 gms.  NO hypotension.  She received 2 unit of blood.  EGD by Alyce demonstrated small

## 2024-10-18 NOTE — PLAN OF CARE
Problem: Discharge Planning  Goal: Discharge to home or other facility with appropriate resources  10/18/2024 1124 by Shannan Paniagua LPN  Outcome: Progressing  10/17/2024 2236 by Levy Cleary, RN  Outcome: Progressing  Flowsheets (Taken 10/17/2024 2134)  Discharge to home or other facility with appropriate resources: Identify barriers to discharge with patient and caregiver     Problem: Pain  Goal: Verbalizes/displays adequate comfort level or baseline comfort level  10/17/2024 2236 by Levy Cleary, RN  Outcome: Progressing     Problem: Safety - Adult  Goal: Free from fall injury  10/17/2024 2236 by Levy Cleary, RN  Outcome: Progressing     Problem: Nutrition Deficit:  Goal: Optimize nutritional status  10/17/2024 2236 by Levy Cleary, RN  Outcome: Progressing

## 2024-10-19 LAB
ALBUMIN SERPL-MCNC: 2.2 G/DL (ref 3.5–5)
ALBUMIN/GLOB SERPL: 0.7 (ref 1.1–2.2)
ALP SERPL-CCNC: 276 U/L (ref 45–117)
ALT SERPL-CCNC: 14 U/L (ref 12–78)
ANION GAP SERPL CALC-SCNC: 7 MMOL/L (ref 2–12)
AST SERPL-CCNC: 84 U/L (ref 15–37)
BASOPHILS # BLD: 0 K/UL (ref 0–0.1)
BASOPHILS NFR BLD: 0 % (ref 0–1)
BILIRUB SERPL-MCNC: 7.9 MG/DL (ref 0.2–1)
BUN SERPL-MCNC: 12 MG/DL (ref 6–20)
BUN/CREAT SERPL: 22 (ref 12–20)
CALCIUM SERPL-MCNC: 8.2 MG/DL (ref 8.5–10.1)
CHLORIDE SERPL-SCNC: 111 MMOL/L (ref 97–108)
CO2 SERPL-SCNC: 21 MMOL/L (ref 21–32)
CREAT SERPL-MCNC: 0.55 MG/DL (ref 0.55–1.02)
DIFFERENTIAL METHOD BLD: ABNORMAL
EOSINOPHIL # BLD: 0.1 K/UL (ref 0–0.4)
EOSINOPHIL NFR BLD: 1 % (ref 0–7)
GLOBULIN SER CALC-MCNC: 3 G/DL (ref 2–4)
GLUCOSE SERPL-MCNC: 93 MG/DL (ref 65–100)
HCT VFR BLD AUTO: 24.3 % (ref 35–47)
HGB BLD-MCNC: 7.9 G/DL (ref 11.5–16)
IMM GRANULOCYTES # BLD AUTO: 0.1 K/UL (ref 0–0.04)
IMM GRANULOCYTES NFR BLD AUTO: 1 % (ref 0–0.5)
INR PPP: 1.4 (ref 0.9–1.1)
LYMPHOCYTES # BLD: 0.8 K/UL (ref 0.8–3.5)
LYMPHOCYTES NFR BLD: 8 % (ref 12–49)
MCH RBC QN AUTO: 33.1 PG (ref 26–34)
MCHC RBC AUTO-ENTMCNC: 32.5 G/DL (ref 30–36.5)
MCV RBC AUTO: 101.7 FL (ref 80–99)
MONOCYTES # BLD: 0.8 K/UL (ref 0–1)
MONOCYTES NFR BLD: 8 % (ref 5–13)
NEUTS SEG # BLD: 7.9 K/UL (ref 1.8–8)
NEUTS SEG NFR BLD: 82 % (ref 32–75)
NRBC # BLD: 0 K/UL (ref 0–0.01)
NRBC BLD-RTO: 0 PER 100 WBC
PLATELET # BLD AUTO: 159 K/UL (ref 150–400)
PMV BLD AUTO: 10.3 FL (ref 8.9–12.9)
POTASSIUM SERPL-SCNC: 3.4 MMOL/L (ref 3.5–5.1)
PROT SERPL-MCNC: 5.2 G/DL (ref 6.4–8.2)
PROTHROMBIN TIME: 14 SEC (ref 9–11.1)
RBC # BLD AUTO: 2.39 M/UL (ref 3.8–5.2)
RBC MORPH BLD: ABNORMAL
SODIUM SERPL-SCNC: 139 MMOL/L (ref 136–145)
WBC # BLD AUTO: 9.7 K/UL (ref 3.6–11)

## 2024-10-19 PROCEDURE — 80053 COMPREHEN METABOLIC PANEL: CPT

## 2024-10-19 PROCEDURE — 6370000000 HC RX 637 (ALT 250 FOR IP): Performed by: NURSE PRACTITIONER

## 2024-10-19 PROCEDURE — 99232 SBSQ HOSP IP/OBS MODERATE 35: CPT | Performed by: INTERNAL MEDICINE

## 2024-10-19 PROCEDURE — 1100000000 HC RM PRIVATE

## 2024-10-19 PROCEDURE — 6360000002 HC RX W HCPCS: Performed by: FAMILY MEDICINE

## 2024-10-19 PROCEDURE — 2580000003 HC RX 258: Performed by: NURSE PRACTITIONER

## 2024-10-19 PROCEDURE — 2580000003 HC RX 258: Performed by: PHYSICIAN ASSISTANT

## 2024-10-19 PROCEDURE — 2580000003 HC RX 258: Performed by: FAMILY MEDICINE

## 2024-10-19 PROCEDURE — 6370000000 HC RX 637 (ALT 250 FOR IP): Performed by: INTERNAL MEDICINE

## 2024-10-19 PROCEDURE — 6360000002 HC RX W HCPCS: Performed by: PHYSICIAN ASSISTANT

## 2024-10-19 PROCEDURE — 85610 PROTHROMBIN TIME: CPT

## 2024-10-19 PROCEDURE — 6370000000 HC RX 637 (ALT 250 FOR IP): Performed by: FAMILY MEDICINE

## 2024-10-19 PROCEDURE — 2580000003 HC RX 258: Performed by: INTERNAL MEDICINE

## 2024-10-19 PROCEDURE — 85025 COMPLETE CBC W/AUTO DIFF WBC: CPT

## 2024-10-19 PROCEDURE — 6360000002 HC RX W HCPCS: Performed by: INTERNAL MEDICINE

## 2024-10-19 RX ORDER — POTASSIUM CHLORIDE 750 MG/1
20 TABLET, EXTENDED RELEASE ORAL ONCE
Status: COMPLETED | OUTPATIENT
Start: 2024-10-19 | End: 2024-10-19

## 2024-10-19 RX ORDER — MIDODRINE HYDROCHLORIDE 5 MG/1
5 TABLET ORAL
Status: DISCONTINUED | OUTPATIENT
Start: 2024-10-19 | End: 2024-10-21

## 2024-10-19 RX ADMIN — PIPERACILLIN AND TAZOBACTAM 3375 MG: 3; .375 INJECTION, POWDER, LYOPHILIZED, FOR SOLUTION INTRAVENOUS at 15:42

## 2024-10-19 RX ADMIN — PANTOPRAZOLE SODIUM 40 MG: 40 INJECTION, POWDER, FOR SOLUTION INTRAVENOUS at 22:24

## 2024-10-19 RX ADMIN — MIDODRINE HYDROCHLORIDE 5 MG: 5 TABLET ORAL at 15:38

## 2024-10-19 RX ADMIN — POTASSIUM CHLORIDE 20 MEQ: 750 TABLET, EXTENDED RELEASE ORAL at 16:38

## 2024-10-19 RX ADMIN — OXYCODONE 5 MG: 5 TABLET ORAL at 18:57

## 2024-10-19 RX ADMIN — OXYCODONE 5 MG: 5 TABLET ORAL at 14:08

## 2024-10-19 RX ADMIN — GABAPENTIN 100 MG: 100 CAPSULE ORAL at 09:45

## 2024-10-19 RX ADMIN — GABAPENTIN 100 MG: 100 CAPSULE ORAL at 22:25

## 2024-10-19 RX ADMIN — SODIUM CHLORIDE, PRESERVATIVE FREE 10 ML: 5 INJECTION INTRAVENOUS at 22:27

## 2024-10-19 RX ADMIN — SODIUM CHLORIDE 25 ML: 9 INJECTION, SOLUTION INTRAVENOUS at 10:15

## 2024-10-19 RX ADMIN — OXYCODONE 5 MG: 5 TABLET ORAL at 09:32

## 2024-10-19 RX ADMIN — SODIUM CHLORIDE 25 ML: 9 INJECTION, SOLUTION INTRAVENOUS at 15:42

## 2024-10-19 RX ADMIN — ONDANSETRON 4 MG: 4 TABLET, ORALLY DISINTEGRATING ORAL at 09:33

## 2024-10-19 RX ADMIN — PANTOPRAZOLE SODIUM 40 MG: 40 INJECTION, POWDER, FOR SOLUTION INTRAVENOUS at 09:55

## 2024-10-19 RX ADMIN — GABAPENTIN 100 MG: 100 CAPSULE ORAL at 14:08

## 2024-10-19 RX ADMIN — SODIUM CHLORIDE, PRESERVATIVE FREE 10 ML: 5 INJECTION INTRAVENOUS at 09:56

## 2024-10-19 RX ADMIN — PIPERACILLIN AND TAZOBACTAM 3375 MG: 3; .375 INJECTION, POWDER, LYOPHILIZED, FOR SOLUTION INTRAVENOUS at 22:32

## 2024-10-19 RX ADMIN — PHYTONADIONE 10 MG: 10 INJECTION, EMULSION INTRAMUSCULAR; INTRAVENOUS; SUBCUTANEOUS at 10:16

## 2024-10-19 RX ADMIN — OXYCODONE 5 MG: 5 TABLET ORAL at 22:29

## 2024-10-19 RX ADMIN — PIPERACILLIN AND TAZOBACTAM 3375 MG: 3; .375 INJECTION, POWDER, LYOPHILIZED, FOR SOLUTION INTRAVENOUS at 06:16

## 2024-10-19 RX ADMIN — THERA TABS 1 TABLET: TAB at 09:54

## 2024-10-19 RX ADMIN — OXYCODONE 5 MG: 5 TABLET ORAL at 05:00

## 2024-10-19 RX ADMIN — SODIUM CHLORIDE: 9 INJECTION, SOLUTION INTRAVENOUS at 10:11

## 2024-10-19 RX ADMIN — FOLIC ACID TAB 400 MCG 1 MG: 400 TAB at 09:47

## 2024-10-19 RX ADMIN — Medication 100 MG: at 09:46

## 2024-10-19 ASSESSMENT — PAIN DESCRIPTION - ORIENTATION
ORIENTATION: INNER;RIGHT;LEFT
ORIENTATION: RIGHT;LEFT;INNER
ORIENTATION: ANTERIOR

## 2024-10-19 ASSESSMENT — PAIN DESCRIPTION - LOCATION
LOCATION: ABDOMEN

## 2024-10-19 ASSESSMENT — PAIN DESCRIPTION - DESCRIPTORS
DESCRIPTORS: CRAMPING
DESCRIPTORS: ACHING;CRAMPING;TENDER
DESCRIPTORS: ACHING;CRAMPING;TENDER

## 2024-10-19 ASSESSMENT — PAIN SCALES - GENERAL
PAINLEVEL_OUTOF10: 9
PAINLEVEL_OUTOF10: 9
PAINLEVEL_OUTOF10: 6
PAINLEVEL_OUTOF10: 8
PAINLEVEL_OUTOF10: 9

## 2024-10-19 NOTE — PLAN OF CARE
Problem: Discharge Planning  Goal: Discharge to home or other facility with appropriate resources  10/18/2024 2357 by Levy Cleary, RN  Outcome: Progressing  Flowsheets (Taken 10/18/2024 2100)  Discharge to home or other facility with appropriate resources: Identify barriers to discharge with patient and caregiver  10/18/2024 1124 by Shannan Paniagua LPN  Outcome: Progressing     Problem: Pain  Goal: Verbalizes/displays adequate comfort level or baseline comfort level  Outcome: Progressing     Problem: Safety - Adult  Goal: Free from fall injury  Outcome: Progressing     Problem: Nutrition Deficit:  Goal: Optimize nutritional status  Outcome: Progressing

## 2024-10-19 NOTE — PROGRESS NOTES
[facility] LIVER [facility]      Akira Calvo MD, FACP, FACG, FAASLD      BUZZ James, [facility]   Sheeba Roberson, [facility]   Sobeida Emeka, [facility]  Gustavo Kurtz, [facility]   Kathi Tillman, [facility]   Elvia Brownon, [facility]      Liver [facility]   [address]   [place]  [address] [phone]   FAX: [phone]  [facility]   [address], [address]   [place]  [address] [phone]   FAX: [phone]       HEPATOLOGY PROGRESS NOTE  The patient is a 39 y.o. female who has consumed 4-5 alcohol drinks per day for many years.    She also has a h/o IVDA but stopped drug use in 2019.  She is currently on suboxone.  She takes a PPI for GERD    She came to the ED because of abdominal pain.      In the ED Laboratory studies were significant for:    Sna 128, Scr 0.68 mg, , ALT 20, , TBILI 10.5 mg, ALB 3.0 gm, WBC 19.1, HB 8.8 gms, , INR 1.2.    Imaging of the liver with Ultrasound and MRI demonstrated hepatic steatosis consistent with alcohol induced hepatitis, nodular surface to the liver suggesting cirrhosis, no significant bile duct dilation, no liver mass, ascites.    Hospital Course to date:  She did not develop withdrawal.    HypoNA resolved with IV fluids.  She did not need IV albumin    TBILI came down from 10 to 8mg  INR improved with Vitamin K  Alcohol induced hepatitis is still mild and DF remains <32.  No need for steroids  The elevation in ALP is due to cholestatic form of alcoholic hepatitis and coming down    US shows only minimal, ascites.  No paracentesis needed  Now on step 1/2 diuretics    She had melena and drop in HB from 7 to 4.5 gms.  NO hypotension.  She received 2 unit of blood.  EGD by Alyce demonstrated small esophageal varices and 2 small

## 2024-10-19 NOTE — PLAN OF CARE
Problem: Nutrition Deficit:  Goal: Optimize nutritional status  Outcome: Not Progressing     Problem: Discharge Planning  Goal: Discharge to home or other facility with appropriate resources  Outcome: Progressing  Flowsheets (Taken 10/19/2024 1016)  Discharge to home or other facility with appropriate resources: Identify discharge learning needs (meds, wound care, etc)     Problem: Pain  Goal: Verbalizes/displays adequate comfort level or baseline comfort level  Outcome: Progressing     Problem: Safety - Adult  Goal: Free from fall injury  Outcome: Progressing     Problem: Nutrition Deficit:  Goal: Optimize nutritional status  Outcome: Not Progressing

## 2024-10-19 NOTE — PROGRESS NOTES
not otherwise noted.      Admission Status:[id number]:::1}      Medications Reviewed:     Current Facility-Administered Medications   Medication Dose Route Frequency    midodrine (PROAMATINE) tablet 5 mg  5 mg Oral TID WC    oxyCODONE (ROXICODONE) immediate release tablet 5 mg  5 mg Oral Q4H PRN    gabapentin (NEURONTIN) capsule 100 mg  100 mg Oral TID    pantoprazole (PROTONIX) 40 mg in sodium chloride (PF) 0.9 % 10 mL injection  40 mg IntraVENous Q12H    ALPRAZolam (XANAX) tablet 0.25 mg  0.25 mg Oral TID PRN    furosemide (LASIX) tablet 20 mg  20 mg Oral Daily    spironolactone (ALDACTONE) tablet 50 mg  50 mg Oral Daily    0.9 % sodium chloride infusion   IntraVENous PRN    simethicone (MYLICON) chewable tablet 80 mg  80 mg Oral Q6H PRN    0.9 % sodium chloride infusion   IntraVENous PRN    calcium carbonate (TUMS) chewable tablet 500 mg  500 mg Oral TID PRN    naloxone (NARCAN) injection 0.4 mg  0.4 mg IntraVENous PRN    piperacillin-tazobactam (ZOSYN) 3,375 mg in sodium chloride 0.9 % 50 mL IVPB (mini-bag)  3,375 mg IntraVENous Q8H    sodium chloride flush 0.9 % injection 5-40 mL  5-40 mL IntraVENous 2 times per day    sodium chloride flush 0.9 % injection 5-40 mL  5-40 mL IntraVENous PRN    0.9 % sodium chloride infusion   IntraVENous PRN    thiamine tablet 100 mg  100 mg Oral Daily    LORazepam (ATIVAN) tablet 1 mg  1 mg Oral Q1H PRN    Or    LORazepam (ATIVAN) injection 1 mg  1 mg IntraVENous Q1H PRN    Or    LORazepam (ATIVAN) tablet 2 mg  2 mg Oral Q1H PRN    Or    LORazepam (ATIVAN) injection 2 mg  2 mg IntraVENous Q1H PRN    Or    LORazepam (ATIVAN) tablet 3 mg  3 mg Oral Q1H PRN    Or    LORazepam (ATIVAN) injection 3 mg  3 mg IntraVENous Q1H PRN    Or    LORazepam (ATIVAN) tablet 4 mg  4 mg Oral Q1H PRN    Or    LORazepam (ATIVAN) injection 4 mg  4 mg IntraVENous Q1H PRN    multivitamin 1 tablet  1 tablet Oral Daily    folic acid (FOLVITE) tablet 1 mg  1 mg Oral Daily    morphine (PF) injection 2 mg  2  mg IntraVENous Q4H PRN    sodium chloride flush 0.9 % injection 5-40 mL  5-40 mL IntraVENous 2 times per day    0.9 % sodium chloride infusion   IntraVENous PRN    magnesium sulfate 2000 mg in 50 mL IVPB premix  2,000 mg IntraVENous PRN    ondansetron (ZOFRAN-ODT) disintegrating tablet 4 mg  4 mg Oral Q8H PRN    Or    ondansetron (ZOFRAN) injection 4 mg  4 mg IntraVENous Q6H PRN    polyethylene glycol (GLYCOLAX) packet 17 g  17 g Oral Daily PRN    acetaminophen (TYLENOL) tablet 650 mg  650 mg Oral Q6H PRN    Or    acetaminophen (TYLENOL) suppository 650 mg  650 mg Rectal Q6H PRN    sodium chloride flush 0.9 % injection 5-40 mL  5-40 mL IntraVENous BID     ______________________________________________________________________  EXPECTED LENGTH OF STAY: Unable to retrieve estimated LOS  ACTUAL LENGTH OF STAY:          8                 Carmen Patricio, APRN - CNP

## 2024-10-20 LAB
ABO + RH BLD: NORMAL
ALBUMIN SERPL-MCNC: 2.1 G/DL (ref 3.5–5)
ALBUMIN/GLOB SERPL: 0.6 (ref 1.1–2.2)
ALP SERPL-CCNC: 303 U/L (ref 45–117)
ALT SERPL-CCNC: 12 U/L (ref 12–78)
AMMONIA PLAS-SCNC: <10 UMOL/L
ANION GAP SERPL CALC-SCNC: 5 MMOL/L (ref 2–12)
AST SERPL-CCNC: 95 U/L (ref 15–37)
BASOPHILS # BLD: 0.1 K/UL (ref 0–0.1)
BASOPHILS NFR BLD: 1 % (ref 0–1)
BILIRUB SERPL-MCNC: 8.1 MG/DL (ref 0.2–1)
BLD PROD TYP BPU: NORMAL
BLOOD BANK BLOOD PRODUCT EXPIRATION DATE: NORMAL
BLOOD BANK DISPENSE STATUS: NORMAL
BLOOD BANK ISBT PRODUCT BLOOD TYPE: 5100
BLOOD BANK PRODUCT CODE: NORMAL
BLOOD BANK UNIT TYPE AND RH: NORMAL
BLOOD GROUP ANTIBODIES SERPL: NORMAL
BPU ID: NORMAL
BUN SERPL-MCNC: 12 MG/DL (ref 6–20)
BUN/CREAT SERPL: 22 (ref 12–20)
CALCIUM SERPL-MCNC: 8.3 MG/DL (ref 8.5–10.1)
CHLORIDE SERPL-SCNC: 111 MMOL/L (ref 97–108)
CO2 SERPL-SCNC: 22 MMOL/L (ref 21–32)
CREAT SERPL-MCNC: 0.54 MG/DL (ref 0.55–1.02)
CROSSMATCH RESULT: NORMAL
DIFFERENTIAL METHOD BLD: ABNORMAL
EOSINOPHIL # BLD: 0.1 K/UL (ref 0–0.4)
EOSINOPHIL NFR BLD: 1 % (ref 0–7)
GLOBULIN SER CALC-MCNC: 3.6 G/DL (ref 2–4)
GLUCOSE SERPL-MCNC: 75 MG/DL (ref 65–100)
HCT VFR BLD AUTO: 25.6 % (ref 35–47)
HGB BLD-MCNC: 8.2 G/DL (ref 11.5–16)
IMM GRANULOCYTES # BLD AUTO: 0.1 K/UL (ref 0–0.04)
IMM GRANULOCYTES NFR BLD AUTO: 1 % (ref 0–0.5)
INR PPP: 1.4 (ref 0.9–1.1)
LYMPHOCYTES # BLD: 1.1 K/UL (ref 0.8–3.5)
LYMPHOCYTES NFR BLD: 10 % (ref 12–49)
MAGNESIUM SERPL-MCNC: 1.5 MG/DL (ref 1.6–2.4)
MCH RBC QN AUTO: 33.1 PG (ref 26–34)
MCHC RBC AUTO-ENTMCNC: 32 G/DL (ref 30–36.5)
MCV RBC AUTO: 103.2 FL (ref 80–99)
MONOCYTES # BLD: 0.8 K/UL (ref 0–1)
MONOCYTES NFR BLD: 7 % (ref 5–13)
NEUTS SEG # BLD: 8.5 K/UL (ref 1.8–8)
NEUTS SEG NFR BLD: 80 % (ref 32–75)
NRBC # BLD: 0 K/UL (ref 0–0.01)
NRBC BLD-RTO: 0 PER 100 WBC
PLATELET # BLD AUTO: 157 K/UL (ref 150–400)
PMV BLD AUTO: 10.1 FL (ref 8.9–12.9)
POTASSIUM SERPL-SCNC: 3.3 MMOL/L (ref 3.5–5.1)
PROT SERPL-MCNC: 5.7 G/DL (ref 6.4–8.2)
PROTHROMBIN TIME: 14 SEC (ref 9–11.1)
RBC # BLD AUTO: 2.48 M/UL (ref 3.8–5.2)
RBC MORPH BLD: ABNORMAL
SODIUM SERPL-SCNC: 138 MMOL/L (ref 136–145)
SPECIMEN EXP DATE BLD: NORMAL
UNIT DIVISION: 0
UNIT ISSUE DATE/TIME: NORMAL
WBC # BLD AUTO: 10.7 K/UL (ref 3.6–11)

## 2024-10-20 PROCEDURE — 6370000000 HC RX 637 (ALT 250 FOR IP)

## 2024-10-20 PROCEDURE — 99232 SBSQ HOSP IP/OBS MODERATE 35: CPT | Performed by: INTERNAL MEDICINE

## 2024-10-20 PROCEDURE — 2580000003 HC RX 258

## 2024-10-20 PROCEDURE — 85025 COMPLETE CBC W/AUTO DIFF WBC: CPT

## 2024-10-20 PROCEDURE — 6370000000 HC RX 637 (ALT 250 FOR IP): Performed by: FAMILY MEDICINE

## 2024-10-20 PROCEDURE — 6360000002 HC RX W HCPCS

## 2024-10-20 PROCEDURE — 6370000000 HC RX 637 (ALT 250 FOR IP): Performed by: NURSE PRACTITIONER

## 2024-10-20 PROCEDURE — 83735 ASSAY OF MAGNESIUM: CPT

## 2024-10-20 PROCEDURE — 2580000003 HC RX 258: Performed by: PHYSICIAN ASSISTANT

## 2024-10-20 PROCEDURE — 6370000000 HC RX 637 (ALT 250 FOR IP): Performed by: INTERNAL MEDICINE

## 2024-10-20 PROCEDURE — 6360000002 HC RX W HCPCS: Performed by: PHYSICIAN ASSISTANT

## 2024-10-20 PROCEDURE — 6360000002 HC RX W HCPCS: Performed by: FAMILY MEDICINE

## 2024-10-20 PROCEDURE — 82140 ASSAY OF AMMONIA: CPT

## 2024-10-20 PROCEDURE — 80053 COMPREHEN METABOLIC PANEL: CPT

## 2024-10-20 PROCEDURE — 85610 PROTHROMBIN TIME: CPT

## 2024-10-20 PROCEDURE — 1100000000 HC RM PRIVATE

## 2024-10-20 PROCEDURE — 2580000003 HC RX 258: Performed by: FAMILY MEDICINE

## 2024-10-20 RX ORDER — SODIUM CHLORIDE 0.9 % (FLUSH) 0.9 %
5-40 SYRINGE (ML) INJECTION PRN
Status: DISCONTINUED | OUTPATIENT
Start: 2024-10-20 | End: 2024-10-25 | Stop reason: HOSPADM

## 2024-10-20 RX ORDER — SODIUM CHLORIDE 9 MG/ML
INJECTION, SOLUTION INTRAVENOUS PRN
Status: DISCONTINUED | OUTPATIENT
Start: 2024-10-20 | End: 2024-10-25 | Stop reason: HOSPADM

## 2024-10-20 RX ORDER — MAGNESIUM SULFATE 1 G/100ML
1000 INJECTION INTRAVENOUS ONCE
Status: COMPLETED | OUTPATIENT
Start: 2024-10-20 | End: 2024-10-20

## 2024-10-20 RX ORDER — SODIUM CHLORIDE 0.9 % (FLUSH) 0.9 %
5-40 SYRINGE (ML) INJECTION EVERY 12 HOURS SCHEDULED
Status: DISCONTINUED | OUTPATIENT
Start: 2024-10-20 | End: 2024-10-25 | Stop reason: HOSPADM

## 2024-10-20 RX ADMIN — MIDODRINE HYDROCHLORIDE 5 MG: 5 TABLET ORAL at 18:17

## 2024-10-20 RX ADMIN — PIPERACILLIN AND TAZOBACTAM 3375 MG: 3; .375 INJECTION, POWDER, LYOPHILIZED, FOR SOLUTION INTRAVENOUS at 15:17

## 2024-10-20 RX ADMIN — OXYCODONE 5 MG: 5 TABLET ORAL at 19:13

## 2024-10-20 RX ADMIN — PANTOPRAZOLE SODIUM 40 MG: 40 INJECTION, POWDER, FOR SOLUTION INTRAVENOUS at 20:23

## 2024-10-20 RX ADMIN — OXYCODONE 5 MG: 5 TABLET ORAL at 15:19

## 2024-10-20 RX ADMIN — MAGNESIUM SULFATE HEPTAHYDRATE 1000 MG: 1 INJECTION, SOLUTION INTRAVENOUS at 11:24

## 2024-10-20 RX ADMIN — THERA TABS 1 TABLET: TAB at 08:27

## 2024-10-20 RX ADMIN — PIPERACILLIN AND TAZOBACTAM 3375 MG: 3; .375 INJECTION, POWDER, LYOPHILIZED, FOR SOLUTION INTRAVENOUS at 06:01

## 2024-10-20 RX ADMIN — Medication 10 ML: at 20:24

## 2024-10-20 RX ADMIN — PIPERACILLIN AND TAZOBACTAM 3375 MG: 3; .375 INJECTION, POWDER, LYOPHILIZED, FOR SOLUTION INTRAVENOUS at 23:29

## 2024-10-20 RX ADMIN — OXYCODONE 5 MG: 5 TABLET ORAL at 05:58

## 2024-10-20 RX ADMIN — Medication 100 MG: at 08:25

## 2024-10-20 RX ADMIN — PANTOPRAZOLE SODIUM 40 MG: 40 INJECTION, POWDER, FOR SOLUTION INTRAVENOUS at 08:28

## 2024-10-20 RX ADMIN — MIDODRINE HYDROCHLORIDE 5 MG: 5 TABLET ORAL at 08:27

## 2024-10-20 RX ADMIN — OXYCODONE 5 MG: 5 TABLET ORAL at 23:26

## 2024-10-20 RX ADMIN — SODIUM CHLORIDE, PRESERVATIVE FREE 10 ML: 5 INJECTION INTRAVENOUS at 20:24

## 2024-10-20 RX ADMIN — SODIUM CHLORIDE 25 ML: 9 INJECTION, SOLUTION INTRAVENOUS at 11:23

## 2024-10-20 RX ADMIN — FOLIC ACID TAB 400 MCG 1 MG: 400 TAB at 08:25

## 2024-10-20 RX ADMIN — POTASSIUM BICARBONATE 40 MEQ: 782 TABLET, EFFERVESCENT ORAL at 20:24

## 2024-10-20 RX ADMIN — POTASSIUM BICARBONATE 40 MEQ: 782 TABLET, EFFERVESCENT ORAL at 11:13

## 2024-10-20 RX ADMIN — OXYCODONE 5 MG: 5 TABLET ORAL at 10:34

## 2024-10-20 RX ADMIN — GABAPENTIN 100 MG: 100 CAPSULE ORAL at 08:26

## 2024-10-20 RX ADMIN — SODIUM CHLORIDE, PRESERVATIVE FREE 10 ML: 5 INJECTION INTRAVENOUS at 08:29

## 2024-10-20 RX ADMIN — MIDODRINE HYDROCHLORIDE 5 MG: 5 TABLET ORAL at 13:24

## 2024-10-20 RX ADMIN — ONDANSETRON 4 MG: 2 INJECTION INTRAMUSCULAR; INTRAVENOUS at 20:30

## 2024-10-20 RX ADMIN — SODIUM CHLORIDE 25 ML: 9 INJECTION, SOLUTION INTRAVENOUS at 15:17

## 2024-10-20 RX ADMIN — GABAPENTIN 100 MG: 100 CAPSULE ORAL at 20:23

## 2024-10-20 RX ADMIN — GABAPENTIN 100 MG: 100 CAPSULE ORAL at 15:18

## 2024-10-20 ASSESSMENT — PAIN SCALES - GENERAL
PAINLEVEL_OUTOF10: 8
PAINLEVEL_OUTOF10: 8
PAINLEVEL_OUTOF10: 7
PAINLEVEL_OUTOF10: 4
PAINLEVEL_OUTOF10: 5
PAINLEVEL_OUTOF10: 8
PAINLEVEL_OUTOF10: 7
PAINLEVEL_OUTOF10: 6

## 2024-10-20 ASSESSMENT — PAIN DESCRIPTION - DESCRIPTORS
DESCRIPTORS: ACHING;CRAMPING;TENDER
DESCRIPTORS: ACHING
DESCRIPTORS: ACHING
DESCRIPTORS: CRAMPING
DESCRIPTORS: THROBBING;ACHING
DESCRIPTORS: ACHING

## 2024-10-20 ASSESSMENT — PAIN DESCRIPTION - ORIENTATION
ORIENTATION: RIGHT;LEFT;INNER
ORIENTATION: RIGHT;INNER;LEFT
ORIENTATION: RIGHT
ORIENTATION: ANTERIOR
ORIENTATION: RIGHT;LEFT;INNER
ORIENTATION: ANTERIOR

## 2024-10-20 ASSESSMENT — PAIN DESCRIPTION - LOCATION
LOCATION: ABDOMEN

## 2024-10-20 NOTE — PROGRESS NOTES
[facility] LIVER [facility]      Akira Calvo MD, FACP, FACG, FAASLD      BUZZ James, [facility]   Sheeba Roberson, [facility]   Sobeida Emeka, [facility]  Gustavo Kurtz, [facility]   Kathi Tillman, [facility]   Elvia Brownon, [facility]      Liver [facility]   [address], [address]   [place]  [address] [phone]   FAX: [phone]  [facility]   [address], [address]   [place]  [address] [phone]   FAX: [phone]       HEPATOLOGY PROGRESS NOTE  The patient is a 39 y.o. female who has consumed 4-5 alcohol drinks per day for many years.    She also has a h/o IVDA but stopped drug use in 2019.  She is currently on suboxone.  She takes a PPI for GERD    She came to the ED because of abdominal pain.      In the ED Laboratory studies were significant for:    Sna 128, Scr 0.68 mg, , ALT 20, , TBILI 10.5 mg, ALB 3.0 gm, WBC 19.1, HB 8.8 gms, , INR 1.2.    Imaging of the liver with Ultrasound and MRI demonstrated hepatic steatosis consistent with alcohol induced hepatitis, nodular surface to the liver suggesting cirrhosis, no significant bile duct dilation, no liver mass, ascites.    Hospital Course to date:  She did not develop withdrawal.    HypoNA resolved with IV fluids.  She did not need IV albumin    TBILI came down from 10 to 8mg  INR improved with Vitamin K  Alcohol induced hepatitis is still mild and DF remains <32.  No need for steroids  The elevation in ALP is due to cholestatic form of alcoholic hepatitis and coming down    US shows only minimal, ascites.  No paracentesis needed  Now on step 1/2 diuretics    She had melena and drop in HB from 7 to 4.5 gms.  NO hypotension.  She received 2 unit of blood.  EGD by Alyce demonstrated small esophageal varices and 2 small

## 2024-10-20 NOTE — PLAN OF CARE
Problem: Discharge Planning  Goal: Discharge to home or other facility with appropriate resources  10/19/2024 2313 by Levy Cleary RN  Outcome: Progressing  Flowsheets (Taken 10/19/2024 2100)  Discharge to home or other facility with appropriate resources: Identify barriers to discharge with patient and caregiver  10/19/2024 1850 by Anayeli Faustin RN  Outcome: Progressing  Flowsheets (Taken 10/19/2024 1016)  Discharge to home or other facility with appropriate resources: Identify discharge learning needs (meds, wound care, etc)     Problem: Pain  Goal: Verbalizes/displays adequate comfort level or baseline comfort level  10/19/2024 2313 by Levy Cleary RN  Outcome: Progressing  10/19/2024 1850 by Anayeli Faustin RN  Outcome: Progressing     Problem: Safety - Adult  Goal: Free from fall injury  10/19/2024 2313 by Levy Cleary RN  Outcome: Progressing  10/19/2024 1850 by Anayeli Faustin RN  Outcome: Progressing     Problem: Nutrition Deficit:  Goal: Optimize nutritional status  10/19/2024 2313 by Levy Cleary RN  Outcome: Progressing  10/19/2024 1850 by Anayeli Faustin RN  Outcome: Not Progressing     Problem: Nutrition Deficit:  Goal: Optimize nutritional status  10/19/2024 2313 by Levy Cleary RN  Outcome: Progressing  10/19/2024 1850 by Anayeli Faustin RN  Outcome: Not Progressing

## 2024-10-20 NOTE — PROGRESS NOTES
(PROAMATINE) tablet 5 mg  5 mg Oral TID WC    oxyCODONE (ROXICODONE) immediate release tablet 5 mg  5 mg Oral Q4H PRN    gabapentin (NEURONTIN) capsule 100 mg  100 mg Oral TID    pantoprazole (PROTONIX) 40 mg in sodium chloride (PF) 0.9 % 10 mL injection  40 mg IntraVENous Q12H    ALPRAZolam (XANAX) tablet 0.25 mg  0.25 mg Oral TID PRN    furosemide (LASIX) tablet 20 mg  20 mg Oral Daily    spironolactone (ALDACTONE) tablet 50 mg  50 mg Oral Daily    0.9 % sodium chloride infusion   IntraVENous PRN    simethicone (MYLICON) chewable tablet 80 mg  80 mg Oral Q6H PRN    0.9 % sodium chloride infusion   IntraVENous PRN    calcium carbonate (TUMS) chewable tablet 500 mg  500 mg Oral TID PRN    naloxone (NARCAN) injection 0.4 mg  0.4 mg IntraVENous PRN    piperacillin-tazobactam (ZOSYN) 3,375 mg in sodium chloride 0.9 % 50 mL IVPB (mini-bag)  3,375 mg IntraVENous Q8H    sodium chloride flush 0.9 % injection 5-40 mL  5-40 mL IntraVENous 2 times per day    sodium chloride flush 0.9 % injection 5-40 mL  5-40 mL IntraVENous PRN    0.9 % sodium chloride infusion   IntraVENous PRN    thiamine tablet 100 mg  100 mg Oral Daily    LORazepam (ATIVAN) tablet 1 mg  1 mg Oral Q1H PRN    Or    LORazepam (ATIVAN) injection 1 mg  1 mg IntraVENous Q1H PRN    Or    LORazepam (ATIVAN) tablet 2 mg  2 mg Oral Q1H PRN    Or    LORazepam (ATIVAN) injection 2 mg  2 mg IntraVENous Q1H PRN    Or    LORazepam (ATIVAN) tablet 3 mg  3 mg Oral Q1H PRN    Or    LORazepam (ATIVAN) injection 3 mg  3 mg IntraVENous Q1H PRN    Or    LORazepam (ATIVAN) tablet 4 mg  4 mg Oral Q1H PRN    Or    LORazepam (ATIVAN) injection 4 mg  4 mg IntraVENous Q1H PRN    multivitamin 1 tablet  1 tablet Oral Daily    folic acid (FOLVITE) tablet 1 mg  1 mg Oral Daily    morphine (PF) injection 2 mg  2 mg IntraVENous Q4H PRN    sodium chloride flush 0.9 % injection 5-40 mL  5-40 mL IntraVENous 2 times per day    0.9 % sodium chloride infusion   IntraVENous PRN    magnesium  sulfate 2000 mg in 50 mL IVPB premix  2,000 mg IntraVENous PRN    ondansetron (ZOFRAN-ODT) disintegrating tablet 4 mg  4 mg Oral Q8H PRN    Or    ondansetron (ZOFRAN) injection 4 mg  4 mg IntraVENous Q6H PRN    polyethylene glycol (GLYCOLAX) packet 17 g  17 g Oral Daily PRN    acetaminophen (TYLENOL) tablet 650 mg  650 mg Oral Q6H PRN    Or    acetaminophen (TYLENOL) suppository 650 mg  650 mg Rectal Q6H PRN    sodium chloride flush 0.9 % injection 5-40 mL  5-40 mL IntraVENous BID     ______________________________________________________________________  EXPECTED LENGTH OF STAY: Unable to retrieve estimated LOS  ACTUAL LENGTH OF STAY:          9                 Anu Henriquez PA-C

## 2024-10-21 ENCOUNTER — APPOINTMENT (OUTPATIENT)
Facility: HOSPITAL | Age: 39
End: 2024-10-21
Payer: MEDICAID

## 2024-10-21 LAB
ANION GAP SERPL CALC-SCNC: 5 MMOL/L (ref 2–12)
BASOPHILS # BLD: 0.1 K/UL (ref 0–0.1)
BASOPHILS NFR BLD: 1 % (ref 0–1)
BUN SERPL-MCNC: 14 MG/DL (ref 6–20)
BUN/CREAT SERPL: 26 (ref 12–20)
CALCIUM SERPL-MCNC: 8.2 MG/DL (ref 8.5–10.1)
CHLORIDE SERPL-SCNC: 109 MMOL/L (ref 97–108)
CO2 SERPL-SCNC: 24 MMOL/L (ref 21–32)
CREAT SERPL-MCNC: 0.53 MG/DL (ref 0.55–1.02)
DIFFERENTIAL METHOD BLD: ABNORMAL
EOSINOPHIL # BLD: 0.1 K/UL (ref 0–0.4)
EOSINOPHIL NFR BLD: 1 % (ref 0–7)
ERYTHROCYTE [DISTWIDTH] IN BLOOD BY AUTOMATED COUNT: ABNORMAL % (ref 11.5–14.5)
GLUCOSE SERPL-MCNC: 79 MG/DL (ref 65–100)
HCT VFR BLD AUTO: 25 % (ref 35–47)
HGB BLD-MCNC: 8.2 G/DL (ref 11.5–16)
IMM GRANULOCYTES # BLD AUTO: 0.1 K/UL (ref 0–0.04)
IMM GRANULOCYTES NFR BLD AUTO: 1 % (ref 0–0.5)
LYMPHOCYTES # BLD: 1.2 K/UL (ref 0.8–3.5)
LYMPHOCYTES NFR BLD: 10 % (ref 12–49)
MCH RBC QN AUTO: 33.2 PG (ref 26–34)
MCHC RBC AUTO-ENTMCNC: 32.8 G/DL (ref 30–36.5)
MCV RBC AUTO: 101.2 FL (ref 80–99)
MONOCYTES # BLD: 0.8 K/UL (ref 0–1)
MONOCYTES NFR BLD: 7 % (ref 5–13)
NEUTS SEG # BLD: 9.2 K/UL (ref 1.8–8)
NEUTS SEG NFR BLD: 80 % (ref 32–75)
NRBC # BLD: 0 K/UL (ref 0–0.01)
NRBC BLD-RTO: 0 PER 100 WBC
PLATELET # BLD AUTO: 161 K/UL (ref 150–400)
PMV BLD AUTO: 10.2 FL (ref 8.9–12.9)
POTASSIUM SERPL-SCNC: 3.7 MMOL/L (ref 3.5–5.1)
RBC # BLD AUTO: 2.47 M/UL (ref 3.8–5.2)
RBC MORPH BLD: ABNORMAL
SODIUM SERPL-SCNC: 138 MMOL/L (ref 136–145)
WBC # BLD AUTO: 11.5 K/UL (ref 3.6–11)

## 2024-10-21 PROCEDURE — 6360000002 HC RX W HCPCS: Performed by: FAMILY MEDICINE

## 2024-10-21 PROCEDURE — 6360000002 HC RX W HCPCS: Performed by: PHYSICIAN ASSISTANT

## 2024-10-21 PROCEDURE — 6370000000 HC RX 637 (ALT 250 FOR IP)

## 2024-10-21 PROCEDURE — 2580000003 HC RX 258: Performed by: PHYSICIAN ASSISTANT

## 2024-10-21 PROCEDURE — 2580000003 HC RX 258

## 2024-10-21 PROCEDURE — 6370000000 HC RX 637 (ALT 250 FOR IP): Performed by: INTERNAL MEDICINE

## 2024-10-21 PROCEDURE — 99231 SBSQ HOSP IP/OBS SF/LOW 25: CPT | Performed by: STUDENT IN AN ORGANIZED HEALTH CARE EDUCATION/TRAINING PROGRAM

## 2024-10-21 PROCEDURE — 76705 ECHO EXAM OF ABDOMEN: CPT

## 2024-10-21 PROCEDURE — 6370000000 HC RX 637 (ALT 250 FOR IP): Performed by: FAMILY MEDICINE

## 2024-10-21 PROCEDURE — 6370000000 HC RX 637 (ALT 250 FOR IP): Performed by: NURSE PRACTITIONER

## 2024-10-21 PROCEDURE — 1100000000 HC RM PRIVATE

## 2024-10-21 PROCEDURE — 2580000003 HC RX 258: Performed by: FAMILY MEDICINE

## 2024-10-21 PROCEDURE — 85025 COMPLETE CBC W/AUTO DIFF WBC: CPT

## 2024-10-21 PROCEDURE — 80048 BASIC METABOLIC PNL TOTAL CA: CPT

## 2024-10-21 RX ORDER — MIDODRINE HYDROCHLORIDE 5 MG/1
10 TABLET ORAL
Status: DISCONTINUED | OUTPATIENT
Start: 2024-10-21 | End: 2024-10-25 | Stop reason: HOSPADM

## 2024-10-21 RX ADMIN — OXYCODONE 5 MG: 5 TABLET ORAL at 09:41

## 2024-10-21 RX ADMIN — OXYCODONE 5 MG: 5 TABLET ORAL at 13:55

## 2024-10-21 RX ADMIN — GABAPENTIN 100 MG: 100 CAPSULE ORAL at 15:18

## 2024-10-21 RX ADMIN — MIDODRINE HYDROCHLORIDE 10 MG: 5 TABLET ORAL at 17:59

## 2024-10-21 RX ADMIN — SODIUM CHLORIDE, PRESERVATIVE FREE 10 ML: 5 INJECTION INTRAVENOUS at 09:33

## 2024-10-21 RX ADMIN — Medication 10 ML: at 09:33

## 2024-10-21 RX ADMIN — PIPERACILLIN AND TAZOBACTAM 3375 MG: 3; .375 INJECTION, POWDER, LYOPHILIZED, FOR SOLUTION INTRAVENOUS at 22:15

## 2024-10-21 RX ADMIN — ONDANSETRON 4 MG: 2 INJECTION INTRAMUSCULAR; INTRAVENOUS at 22:48

## 2024-10-21 RX ADMIN — SODIUM CHLORIDE, PRESERVATIVE FREE 10 ML: 5 INJECTION INTRAVENOUS at 22:10

## 2024-10-21 RX ADMIN — PANTOPRAZOLE SODIUM 40 MG: 40 INJECTION, POWDER, FOR SOLUTION INTRAVENOUS at 22:07

## 2024-10-21 RX ADMIN — MIDODRINE HYDROCHLORIDE 10 MG: 5 TABLET ORAL at 13:49

## 2024-10-21 RX ADMIN — OXYCODONE 5 MG: 5 TABLET ORAL at 22:07

## 2024-10-21 RX ADMIN — Medication 10 ML: at 22:10

## 2024-10-21 RX ADMIN — PIPERACILLIN AND TAZOBACTAM 3375 MG: 3; .375 INJECTION, POWDER, LYOPHILIZED, FOR SOLUTION INTRAVENOUS at 06:11

## 2024-10-21 RX ADMIN — PANTOPRAZOLE SODIUM 40 MG: 40 INJECTION, POWDER, FOR SOLUTION INTRAVENOUS at 09:25

## 2024-10-21 RX ADMIN — Medication 100 MG: at 09:25

## 2024-10-21 RX ADMIN — SODIUM CHLORIDE, PRESERVATIVE FREE 10 ML: 5 INJECTION INTRAVENOUS at 09:46

## 2024-10-21 RX ADMIN — FOLIC ACID TAB 400 MCG 1 MG: 400 TAB at 09:40

## 2024-10-21 RX ADMIN — SPIRONOLACTONE 50 MG: 25 TABLET ORAL at 09:41

## 2024-10-21 RX ADMIN — THERA TABS 1 TABLET: TAB at 09:41

## 2024-10-21 RX ADMIN — MIDODRINE HYDROCHLORIDE 5 MG: 5 TABLET ORAL at 09:41

## 2024-10-21 RX ADMIN — GABAPENTIN 100 MG: 100 CAPSULE ORAL at 09:41

## 2024-10-21 RX ADMIN — OXYCODONE 5 MG: 5 TABLET ORAL at 17:59

## 2024-10-21 RX ADMIN — FUROSEMIDE 20 MG: 20 TABLET ORAL at 09:41

## 2024-10-21 RX ADMIN — PIPERACILLIN AND TAZOBACTAM 3375 MG: 3; .375 INJECTION, POWDER, LYOPHILIZED, FOR SOLUTION INTRAVENOUS at 15:20

## 2024-10-21 RX ADMIN — GABAPENTIN 100 MG: 100 CAPSULE ORAL at 22:06

## 2024-10-21 RX ADMIN — SODIUM CHLORIDE, PRESERVATIVE FREE 10 ML: 5 INJECTION INTRAVENOUS at 22:09

## 2024-10-21 RX ADMIN — OXYCODONE 5 MG: 5 TABLET ORAL at 05:55

## 2024-10-21 ASSESSMENT — PAIN SCALES - WONG BAKER
WONGBAKER_NUMERICALRESPONSE: NO HURT

## 2024-10-21 ASSESSMENT — PAIN DESCRIPTION - ORIENTATION
ORIENTATION: RIGHT;LEFT
ORIENTATION: RIGHT
ORIENTATION: ANTERIOR
ORIENTATION: RIGHT;LEFT;ANTERIOR;DISTAL;INNER
ORIENTATION: RIGHT;LEFT;ANTERIOR;DISTAL;INNER

## 2024-10-21 ASSESSMENT — PAIN DESCRIPTION - LOCATION
LOCATION: ABDOMEN

## 2024-10-21 ASSESSMENT — PAIN SCALES - GENERAL
PAINLEVEL_OUTOF10: 1
PAINLEVEL_OUTOF10: 6
PAINLEVEL_OUTOF10: 0
PAINLEVEL_OUTOF10: 7
PAINLEVEL_OUTOF10: 1
PAINLEVEL_OUTOF10: 6
PAINLEVEL_OUTOF10: 7
PAINLEVEL_OUTOF10: 4
PAINLEVEL_OUTOF10: 6

## 2024-10-21 ASSESSMENT — PAIN DESCRIPTION - DESCRIPTORS
DESCRIPTORS: ACHING
DESCRIPTORS: ACHING;BURNING;CRAMPING;CRUSHING
DESCRIPTORS: ACHING

## 2024-10-21 NOTE — PROGRESS NOTES
[facility] Adult  Hospitalist Group                                                                                          Hospitalist Progress Note  Anu Henriquez PA-C  Answering service: [phone] OR [phone] from in house phone        Date of Service:  10/21/2024  NAME:  Kristie Rowan  :  1985  MRN:  [id number]       Admission Summary:    Kristie Rowan is a  39 y.o. female with apmhx past heroin abuse, and etoh dependence who presents with abdominal pain, n/v, and jaundice.  She was in her usual state of health until 1.5 weeks ago when she developed nausea, vomiting, abdominal pain and bloating.  She denies fever, chills, diarrhea, or dysuria.  She has a remote hx of IV heroin use, and has been sober for 4 years.  She states she tested negative for hep C 4 years ago.  She does drink 3 glasses of vodka or wine daily.  She denies hx withdrawal.      In the ED, she was tachycardic to 118.  Other VSS.  Lab showed WBC 19.1, macrocytic anemia with Hgb 8.8, Na 128, + 3.1, , Alk phos 599, T. Bili 10.5, and albumin 3.  US abdomen showed hepatomegaly with increased liver echogenicity and ascites, and gallbladder sludge with wall thickening an pericholecystic fluid.  MRCP was ordered, and is pending.     In the ED, she received morphine, toradol, and zofran    Interval history / Subjective:     F/u Alcohol Hepatitis  s/p PRBC x3 units  Patient seen and examined this am, laying in bed watching Netflix on her laptop. C/o \"bloating\" in her abdomen, noting pain is slightly worse than yesterday. Does note 2 episodes of loose stools today with last BM this morning at 3am. Complains of some nausea since last night.  Denies fever, chills, cp/sob, rash.   Nursing reports Unable to give Lasix and Brayan past few days due to soft bp, increased  midodrine to 10mg today.   Hgb stable. Per hepatology, no paracentesis needed. Possible dc tomorrow if SBP strong enough to start diuretics.      Assessment  & Plan:      Alcohol Hepatitis with transaminitis  -MRI 10/11 showed: . Enlarged liver with geographic areas of hepatic steatosis and possible early fibrotic changes. Mild sludge in the gallbladder. No significant gallbladder wall thickening is noted. No biliary dilatation.  -RUQ u/s on 10/14 showed Small volume of ascites.  Paracentesis not performed.  -Hepatology following, appreciate recommendations   -EGD done 10/16 Esophagus: Small varices without stigmata of recent hemorrhage  Stomach: 2 clean-based ulcers with greatest diameter of 15 mm in the pre-pyloric antrum status post biopsies, mild portal hypertensive gastropathy  Duodenum: normal  -continue PPI bid  -INR trended down 1.4 10/20  -Hgb 8.2 today   - s/p 3 units PRBC .   -Sodium restricted diet  -Has been unable to tolerated lasix and spironolactone past few days due to soft bp sbp in low 100's--> [place] recommending midodrine 5mg TID --> increased to 10mg today  - Paracentesis ordered -> not preformed due to persistently low ascitic fluid  - increase to step 1 diuretics as soon as BP allows   -continue MVI, folate, thiamine     Hypotension  -holding lasix and spironolactone as bp has been soft,low 100's -> Started Midodrine 5mg TID --> increased to 10mg TID    Cholecystitis  -seen by surgery, HIDA scan normal, no surgery recommended at this time    Anemia, acute blood loss,   S/p EGD  by Alyce demonstrated small esophageal varices and 2 small  which were likely the site of bleeding   S/p 2 units PRBC 10/14/ s/p 1 unit PRBC 10/18  -Hgb 6.9  10/18 1 unit of blood given->> 8.2 today   -trend daily    Alcohol use disorder  -Continue CIWA protocol with as needed benzodiazepines  -Last drink 10/12, no withdrawals, reporting at least 4 drinks daily  ETOH cessation, encouraged rehab, she declines rehab & says she can do it on her own.  Consider support groupZOEY.       History of opiate use disorder  Pharmacy consult, not currently on buprenorphine    Mild

## 2024-10-21 NOTE — PLAN OF CARE
Problem: Discharge Planning  Goal: Discharge to home or other facility with appropriate resources  10/21/2024 0945 by Shannan Paniagua LPN  Outcome: Progressing  10/20/2024 2128 by Anayeli Faustin RN  Outcome: Progressing  10/20/2024 2036 by Marisol Tobin RN  Outcome: Progressing  Flowsheets (Taken 10/20/2024 0835 by Anayeli Faustin RN)  Discharge to home or other facility with appropriate resources: Identify discharge learning needs (meds, wound care, etc)     Problem: Pain  Goal: Verbalizes/displays adequate comfort level or baseline comfort level  10/20/2024 2128 by Anayeli Faustin RN  Outcome: Progressing  10/20/2024 2036 by Marisol Tobin RN  Outcome: Progressing     Problem: Safety - Adult  Goal: Free from fall injury  10/20/2024 2128 by Anayeli Faustin RN  Outcome: Progressing  10/20/2024 2036 by Marisol Tobin RN  Outcome: Progressing     Problem: Nutrition Deficit:  Goal: Optimize nutritional status  10/20/2024 2128 by Anayeli Faustin RN  Outcome: Progressing  10/20/2024 2036 by Marisol Tobin RN  Outcome: Progressing

## 2024-10-21 NOTE — CARE COORDINATION
Transition of Care Plan:    RUR: 9%  Prior Level of Functioning: independent  Disposition: Home  If SNF or IPR: Date FOC offered:   Date FOC received:   Accepting facility:   Date authorization started with reference number:   Date authorization received and expires:   Follow up appointments: per physician recommendation   DME needed: N/A  Transportation at discharge: family  IM/IMM Medicare/ letter given: N/A  Caregiver Contact: Justin Rowan [phone]  Barriers to discharge: medical stability, monitor labs    CM reviewed chart, noted: Patient explained that she is not working at this point.  Patient gets medications at [facility] on [address] near [facility]. Patient indicated that she will not go to Rehab.  This was discussed with patient.  Patient does not have a history of ETOH rehab. Support was offered. Patient does not use DME in the home.     Hepatology, Gastro, Gen Surg, Hospitalist are following.     CM to follow for any discharge needs.     Elif Pfeiffer RN/CRM

## 2024-10-21 NOTE — PROGRESS NOTES
Physician Progress Note      PATIENT:               RANDY GONZALEZ  CSN #:                  [id number]  :                       1985  ADMIT DATE:       10/11/2024 4:42 PM  DISCH DATE:  RESPONDING  PROVIDER #:        Alivia Thornton MD          QUERY TEXT:    Pt admitted with Jaundice and has moderate malnutrition documented in RD PN   10/18/2024. Please specify type of malnutrition with documentation in the   medical record.    The medical record reflects the following:  Risk Factors: PMH of alcohol abuse, heroin abuse, decreased appetite x 1-2   weeks    Clinical Indicators:  Per RD PN 10/18/2024  Malnutrition Status:  Moderate malnutrition (10/14/24 1405)  Context:  Acute Illness  Findings of the 6 clinical characteristics of malnutrition:  Energy Intake:  50% or less of estimated energy requirements for 5 or more   days  Weight Loss:  Unable to assess  Body Fat Loss:  Mild body fat loss Buccal region, Orbital  Muscle Mass Loss:  Mild muscle mass loss Temples (temporalis), Clavicles   (pectoralis & deltoids)  Ideal Body Weight (IBW): 125 lbs (57 kg)  Current Body Weight: 45.8 kg (100 lb 15.5 oz), 80.8 % IBW. Weight Source:   Standing Scale  Current BMI (kg/m2): 16.8  Usual Body Weight: 47.6 kg (105 lb)  % Weight Change (Calculated): -3.8    Treatment: receiving RD- Continue Oral Nutrition Supplement, Continue current   diet    ASPEN Criteria:    https://aspenjournals.onlinelibrary.abbasi.com/doi/full/10.1177/901723933985449  5  Options provided:  -- Moderate Malnutrition  -- Moderate Protein calorie malnutrition  -- Other - I will add my own diagnosis  -- Disagree - Not applicable / Not valid  -- Disagree - Clinically unable to determine / Unknown  -- Refer to Clinical Documentation Reviewer    PROVIDER RESPONSE TEXT:    This patient has moderate malnutrition.    Query created by: Destiny Jules on 10/18/2024 2:34 PM      Electronically signed by:  Alivia Thornton MD 10/21/2024 12:40 PM

## 2024-10-21 NOTE — PROGRESS NOTES
[facility] LIVER [facility]      Akira Calvo MD, FACP, FACG, FAASLD      BUZZ James, [facility]   Sheeba Roberson, [facility]   Sobeida Emeka, [facility]  Gustavo Kurtz, [facility]   Kathi Tillman, [facility]   Elvia Brownon, [facility]      Liver [facility]   [address], [address]   [place]  [address] [phone]   FAX: [phone]  [facility]   [address], [address]   [place]  [address] [phone]   FAX: [phone]       HEPATOLOGY PROGRESS NOTE  The patient is a 39 y.o. female who has consumed 4-5 alcohol drinks per day for many years.    She also has a h/o IVDA but stopped drug use in 2019.  She is currently on suboxone.  She takes a PPI for GERD    She came to the ED because of abdominal pain.      In the ED Laboratory studies were significant for:    Sna 128, Scr 0.68 mg, , ALT 20, , TBILI 10.5 mg, ALB 3.0 gm, WBC 19.1, HB 8.8 gms, , INR 1.2.    Imaging of the liver with Ultrasound and MRI demonstrated hepatic steatosis consistent with alcohol induced hepatitis, nodular surface to the liver suggesting cirrhosis, no significant bile duct dilation, no liver mass, ascites.    Hospital Course to date:  She did not develop withdrawal.    HypoNA resolved with IV fluids.  She did not need IV albumin    TBILI came down from 10 to 8mg  INR improved with Vitamin K  Alcohol induced hepatitis is still mild and DF remains <32.  No need for steroids  The elevation in ALP is due to cholestatic form of alcoholic hepatitis and coming down    US shows only minimal, ascites.  No paracentesis needed  Now on step 1/2 diuretics    She had melena and drop in HB from 7 to 4.5 gms.  NO hypotension.  She received 2 unit of blood.  EGD by Alyce demonstrated small esophageal varices and 2 small

## 2024-10-22 LAB
ANION GAP SERPL CALC-SCNC: 5 MMOL/L (ref 2–12)
BASOPHILS # BLD: 0 K/UL (ref 0–0.1)
BASOPHILS NFR BLD: 0 % (ref 0–1)
BUN SERPL-MCNC: 16 MG/DL (ref 6–20)
BUN/CREAT SERPL: 28 (ref 12–20)
CALCIUM SERPL-MCNC: 8.3 MG/DL (ref 8.5–10.1)
CHLORIDE SERPL-SCNC: 108 MMOL/L (ref 97–108)
CO2 SERPL-SCNC: 25 MMOL/L (ref 21–32)
CREAT SERPL-MCNC: 0.57 MG/DL (ref 0.55–1.02)
DIFFERENTIAL METHOD BLD: ABNORMAL
EOSINOPHIL # BLD: 0.1 K/UL (ref 0–0.4)
EOSINOPHIL NFR BLD: 1 % (ref 0–7)
GLUCOSE SERPL-MCNC: 78 MG/DL (ref 65–100)
HCT VFR BLD AUTO: 21.8 % (ref 35–47)
HGB BLD-MCNC: 7.1 G/DL (ref 11.5–16)
IMM GRANULOCYTES # BLD AUTO: 0.1 K/UL (ref 0–0.04)
IMM GRANULOCYTES NFR BLD AUTO: 1 % (ref 0–0.5)
LYMPHOCYTES # BLD: 1.3 K/UL (ref 0.8–3.5)
LYMPHOCYTES NFR BLD: 9 % (ref 12–49)
MCH RBC QN AUTO: 34 PG (ref 26–34)
MCHC RBC AUTO-ENTMCNC: 32.6 G/DL (ref 30–36.5)
MCV RBC AUTO: 104.3 FL (ref 80–99)
MONOCYTES # BLD: 1.1 K/UL (ref 0–1)
MONOCYTES NFR BLD: 8 % (ref 5–13)
NEUTS SEG # BLD: 11.4 K/UL (ref 1.8–8)
NEUTS SEG NFR BLD: 81 % (ref 32–75)
NRBC # BLD: 0 K/UL (ref 0–0.01)
NRBC BLD-RTO: 0 PER 100 WBC
PLATELET # BLD AUTO: 165 K/UL (ref 150–400)
PMV BLD AUTO: 10.8 FL (ref 8.9–12.9)
POTASSIUM SERPL-SCNC: 3.6 MMOL/L (ref 3.5–5.1)
RBC # BLD AUTO: 2.09 M/UL (ref 3.8–5.2)
RBC MORPH BLD: ABNORMAL
RBC MORPH BLD: ABNORMAL
SODIUM SERPL-SCNC: 138 MMOL/L (ref 136–145)
WBC # BLD AUTO: 14 K/UL (ref 3.6–11)

## 2024-10-22 PROCEDURE — 2580000003 HC RX 258: Performed by: FAMILY MEDICINE

## 2024-10-22 PROCEDURE — 1100000000 HC RM PRIVATE

## 2024-10-22 PROCEDURE — 2580000003 HC RX 258

## 2024-10-22 PROCEDURE — 6370000000 HC RX 637 (ALT 250 FOR IP): Performed by: NURSE PRACTITIONER

## 2024-10-22 PROCEDURE — 6360000002 HC RX W HCPCS: Performed by: FAMILY MEDICINE

## 2024-10-22 PROCEDURE — 6360000002 HC RX W HCPCS: Performed by: NURSE PRACTITIONER

## 2024-10-22 PROCEDURE — 6360000002 HC RX W HCPCS: Performed by: PHYSICIAN ASSISTANT

## 2024-10-22 PROCEDURE — 2580000003 HC RX 258: Performed by: PHYSICIAN ASSISTANT

## 2024-10-22 PROCEDURE — 80048 BASIC METABOLIC PNL TOTAL CA: CPT

## 2024-10-22 PROCEDURE — 85025 COMPLETE CBC W/AUTO DIFF WBC: CPT

## 2024-10-22 PROCEDURE — 6370000000 HC RX 637 (ALT 250 FOR IP)

## 2024-10-22 PROCEDURE — 6370000000 HC RX 637 (ALT 250 FOR IP): Performed by: FAMILY MEDICINE

## 2024-10-22 RX ADMIN — FOLIC ACID TAB 400 MCG 1 MG: 400 TAB at 08:47

## 2024-10-22 RX ADMIN — OXYCODONE 5 MG: 5 TABLET ORAL at 13:05

## 2024-10-22 RX ADMIN — MORPHINE SULFATE 2 MG: 2 INJECTION, SOLUTION INTRAMUSCULAR; INTRAVENOUS at 06:29

## 2024-10-22 RX ADMIN — GABAPENTIN 100 MG: 100 CAPSULE ORAL at 21:19

## 2024-10-22 RX ADMIN — ONDANSETRON 4 MG: 2 INJECTION INTRAMUSCULAR; INTRAVENOUS at 17:17

## 2024-10-22 RX ADMIN — Medication 100 MG: at 08:47

## 2024-10-22 RX ADMIN — PIPERACILLIN AND TAZOBACTAM 3375 MG: 3; .375 INJECTION, POWDER, LYOPHILIZED, FOR SOLUTION INTRAVENOUS at 06:31

## 2024-10-22 RX ADMIN — THERA TABS 1 TABLET: TAB at 08:47

## 2024-10-22 RX ADMIN — GABAPENTIN 100 MG: 100 CAPSULE ORAL at 13:37

## 2024-10-22 RX ADMIN — SIMETHICONE 80 MG: 80 TABLET, CHEWABLE ORAL at 03:51

## 2024-10-22 RX ADMIN — OXYCODONE 5 MG: 5 TABLET ORAL at 03:49

## 2024-10-22 RX ADMIN — OXYCODONE 5 MG: 5 TABLET ORAL at 21:19

## 2024-10-22 RX ADMIN — MIDODRINE HYDROCHLORIDE 10 MG: 5 TABLET ORAL at 11:58

## 2024-10-22 RX ADMIN — OXYCODONE 5 MG: 5 TABLET ORAL at 17:14

## 2024-10-22 RX ADMIN — Medication 10 ML: at 21:20

## 2024-10-22 RX ADMIN — SODIUM CHLORIDE, PRESERVATIVE FREE 10 ML: 5 INJECTION INTRAVENOUS at 21:19

## 2024-10-22 RX ADMIN — SODIUM CHLORIDE, PRESERVATIVE FREE 10 ML: 5 INJECTION INTRAVENOUS at 21:21

## 2024-10-22 RX ADMIN — SODIUM CHLORIDE, PRESERVATIVE FREE 10 ML: 5 INJECTION INTRAVENOUS at 21:20

## 2024-10-22 RX ADMIN — PANTOPRAZOLE SODIUM 40 MG: 40 INJECTION, POWDER, FOR SOLUTION INTRAVENOUS at 08:48

## 2024-10-22 RX ADMIN — CALCIUM CARBONATE (ANTACID) CHEW TAB 500 MG 500 MG: 500 CHEW TAB at 03:51

## 2024-10-22 RX ADMIN — GABAPENTIN 100 MG: 100 CAPSULE ORAL at 08:47

## 2024-10-22 RX ADMIN — OXYCODONE 5 MG: 5 TABLET ORAL at 08:47

## 2024-10-22 RX ADMIN — MIDODRINE HYDROCHLORIDE 10 MG: 5 TABLET ORAL at 17:04

## 2024-10-22 RX ADMIN — MIDODRINE HYDROCHLORIDE 10 MG: 5 TABLET ORAL at 06:32

## 2024-10-22 RX ADMIN — PANTOPRAZOLE SODIUM 40 MG: 40 INJECTION, POWDER, FOR SOLUTION INTRAVENOUS at 21:19

## 2024-10-22 ASSESSMENT — PAIN DESCRIPTION - DESCRIPTORS
DESCRIPTORS: ACHING

## 2024-10-22 ASSESSMENT — PAIN DESCRIPTION - LOCATION
LOCATION: ABDOMEN

## 2024-10-22 ASSESSMENT — PAIN DESCRIPTION - ORIENTATION
ORIENTATION: ANTERIOR
ORIENTATION: INNER
ORIENTATION: ANTERIOR
ORIENTATION: ANTERIOR

## 2024-10-22 ASSESSMENT — PAIN SCALES - GENERAL
PAINLEVEL_OUTOF10: 5
PAINLEVEL_OUTOF10: 4
PAINLEVEL_OUTOF10: 8
PAINLEVEL_OUTOF10: 6
PAINLEVEL_OUTOF10: 8
PAINLEVEL_OUTOF10: 8
PAINLEVEL_OUTOF10: 4
PAINLEVEL_OUTOF10: 7
PAINLEVEL_OUTOF10: 6

## 2024-10-22 ASSESSMENT — PAIN SCALES - WONG BAKER
WONGBAKER_NUMERICALRESPONSE: NO HURT

## 2024-10-22 NOTE — PLAN OF CARE
Problem: Discharge Planning  Goal: Discharge to home or other facility with appropriate resources  10/22/2024 0909 by Lavinia Huerta, RN  Outcome: Progressing     Problem: Pain  Goal: Verbalizes/displays adequate comfort level or baseline comfort level  10/22/2024 0909 by Lavinia Huerta, RN  Outcome: Progressing     Problem: Safety - Adult  Goal: Free from fall injury  10/22/2024 0909 by Lavinia Huerta, RN  Outcome: Progressing     Problem: Nutrition Deficit:  Goal: Optimize nutritional status  10/22/2024 0909 by Lavinia Huerta, RN  Outcome: Progressing

## 2024-10-22 NOTE — PROGRESS NOTES
Comprehensive Nutrition Assessment    Type and Reason for Visit: Reassess    Nutrition Recommendations/Plan:   Continue regular, low sodium, GI bland diet  Ensure Plant Based ONS TID  Monitor weight, standing scale if possible  Continue current vitamin/mineral supplementation     Malnutrition Assessment:  Malnutrition Status:  At risk for malnutrition (Comment) (10/22/24 1058)    Context:  Acute Illness     Findings of the 6 clinical characteristics of malnutrition:  Energy Intake:  75% or less of estimated energy requirements for 7 or more days  Weight Loss:  Unable to assess     Body Fat Loss:  Mild body fat loss Buccal region, Orbital   Muscle Mass Loss:  Mild muscle mass loss Temples (temporalis), Clavicles (pectoralis & deltoids)  Fluid Accumulation:  Unable to assess     Strength:  Not Performed     Nutrition Assessment:    40 yo female admitted for jaundice, presented w abd pain, N/V, jaundice, decreased appetite x 1-2 weeks. PMH of alcohol abuse, heroin abuse.    10/22: f/u. Diuretics held d/t low BP, ascites increasing. BP increasing after starting midodrine, MD plan to restart diuretics as BP allows. Per hepatology, no paracentesis needed. Possible dc today if BP strong enough to start diuretics.   Spoke with pt at bedside, reports eating % of meals depending on if she likes the food. She is drinking 1 Ensure Plant Based ONS per day, but would still like all three sent. No updated weight since admission. Mentioned she is going to focus on \"colorful\" foods and protein at home and try eating more consistently. Pt no longer meets criteria for malnutrition based on improved PO intake, will still place at risk and continue to monitor.     10/18: f/u. HIDA scan appeared to be normal, no need for cholecystectomy, general surgery signed off. GI following, concern for possible hematochezia. S/p EGD 10/16, findings include small varices in esophagus and 2 small gastric ulcers, which were likely to site of  Outcomes: Biochemical Data, GI Status, Meal Time Behavior, Nutrition Focused Physical Findings, Weight    Discharge Planning:    Continue Oral Nutrition Supplement, Continue current diet     Cydney Brooks RD  Available via [employer]

## 2024-10-22 NOTE — PLAN OF CARE
Problem: Discharge Planning  Goal: Discharge to home or other facility with appropriate resources  10/21/2024 2343 by Marisol Tobin, RN  Outcome: Progressing  10/21/2024 0945 by Shannan Paniagua LPN  Outcome: Progressing     Problem: Pain  Goal: Verbalizes/displays adequate comfort level or baseline comfort level  Outcome: Progressing     Problem: Safety - Adult  Goal: Free from fall injury  Outcome: Progressing     Problem: Nutrition Deficit:  Goal: Optimize nutritional status  Outcome: Progressing

## 2024-10-22 NOTE — PROGRESS NOTES
[facility] Adult  Hospitalist Group                                                                                          Hospitalist Progress Note  KONRAD Marcus - NP  Answering service: [phone] OR [phone] from in house phone        Date of Service:  10/22/2024  NAME:  Kristie Rowan  :  1985  MRN:  [id number]       Admission Summary:    Kristie Rowan is a  39 y.o. female with apmhx past heroin abuse, and etoh dependence who presents with abdominal pain, n/v, and jaundice.  She was in her usual state of health until 1.5 weeks ago when she developed nausea, vomiting, abdominal pain and bloating.  She denies fever, chills, diarrhea, or dysuria.  She has a remote hx of IV heroin use, and has been sober for 4 years.  She states she tested negative for hep C 4 years ago.  She does drink 3 glasses of vodka or wine daily.  She denies hx withdrawal.      In the ED, she was tachycardic to 118.  Other VSS.  Lab showed WBC 19.1, macrocytic anemia with Hgb 8.8, Na 128, + 3.1, , Alk phos 599, T. Bili 10.5, and albumin 3.  US abdomen showed hepatomegaly with increased liver echogenicity and ascites, and gallbladder sludge with wall thickening an pericholecystic fluid.  MRCP was ordered, and is pending.     In the ED, she received morphine, toradol, and zofran    Interval history / Subjective:   Hgb 7.1, drifting down  Patient with intermittent spotty bleeding inside left nare that started this morning, not continuous though. Tissue with spots of blood  Endorsing loose stools x 10 days  SBP still soft despite midodrine 10 mg 3 times daily, unable to increase diuretics  Additionally complaining of bloated abdomen, had ultrasound yesterday which showed trace ascites and not a candidate for paracentesis  Leukocytosis this morning 14.0, afebrile, patient does not have cholecystitis.  Stop Zosyn and monitor     Assessment & Plan:     Alcohol Hepatitis with transaminitis  - MRI abd 10/11:

## 2024-10-23 LAB
ALBUMIN SERPL-MCNC: 1.8 G/DL (ref 3.5–5)
ALBUMIN/GLOB SERPL: 0.5 (ref 1.1–2.2)
ALP SERPL-CCNC: 266 U/L (ref 45–117)
ALT SERPL-CCNC: 13 U/L (ref 12–78)
AST SERPL-CCNC: 97 U/L (ref 15–37)
BASOPHILS # BLD: 0.1 K/UL (ref 0–0.1)
BASOPHILS NFR BLD: 1 % (ref 0–1)
BILIRUB DIRECT SERPL-MCNC: 5.7 MG/DL (ref 0–0.2)
BILIRUB SERPL-MCNC: 6.7 MG/DL (ref 0.2–1)
COMMENT:: NORMAL
DIFFERENTIAL METHOD BLD: ABNORMAL
EOSINOPHIL # BLD: 0.1 K/UL (ref 0–0.4)
EOSINOPHIL NFR BLD: 1 % (ref 0–7)
ERYTHROCYTE [DISTWIDTH] IN BLOOD BY AUTOMATED COUNT: ABNORMAL % (ref 11.5–14.5)
GLOBULIN SER CALC-MCNC: 3.9 G/DL (ref 2–4)
HCT VFR BLD AUTO: 20.6 % (ref 35–47)
HCT VFR BLD AUTO: 25.2 % (ref 35–47)
HGB BLD-MCNC: 6.7 G/DL (ref 11.5–16)
HGB BLD-MCNC: 8.4 G/DL (ref 11.5–16)
HISTORY CHECK: NORMAL
IMM GRANULOCYTES # BLD AUTO: 0.1 K/UL (ref 0–0.04)
IMM GRANULOCYTES NFR BLD AUTO: 1 % (ref 0–0.5)
LYMPHOCYTES # BLD: 1.1 K/UL (ref 0.8–3.5)
LYMPHOCYTES NFR BLD: 10 % (ref 12–49)
MCH RBC QN AUTO: 33.3 PG (ref 26–34)
MCHC RBC AUTO-ENTMCNC: 32.5 G/DL (ref 30–36.5)
MCV RBC AUTO: 102.5 FL (ref 80–99)
MONOCYTES # BLD: 0.8 K/UL (ref 0–1)
MONOCYTES NFR BLD: 7 % (ref 5–13)
NEUTS SEG # BLD: 9.1 K/UL (ref 1.8–8)
NEUTS SEG NFR BLD: 80 % (ref 32–75)
NRBC # BLD: 0 K/UL (ref 0–0.01)
NRBC BLD-RTO: 0 PER 100 WBC
PLATELET # BLD AUTO: 163 K/UL (ref 150–400)
PMV BLD AUTO: 10.6 FL (ref 8.9–12.9)
PROT SERPL-MCNC: 5.7 G/DL (ref 6.4–8.2)
RBC # BLD AUTO: 2.01 M/UL (ref 3.8–5.2)
RBC MORPH BLD: ABNORMAL
RBC MORPH BLD: ABNORMAL
SPECIMEN HOLD: NORMAL
WBC # BLD AUTO: 11.3 K/UL (ref 3.6–11)

## 2024-10-23 PROCEDURE — 85014 HEMATOCRIT: CPT

## 2024-10-23 PROCEDURE — 2580000003 HC RX 258: Performed by: PHYSICIAN ASSISTANT

## 2024-10-23 PROCEDURE — 6370000000 HC RX 637 (ALT 250 FOR IP): Performed by: NURSE PRACTITIONER

## 2024-10-23 PROCEDURE — 86900 BLOOD TYPING SEROLOGIC ABO: CPT

## 2024-10-23 PROCEDURE — 6360000002 HC RX W HCPCS: Performed by: PHYSICIAN ASSISTANT

## 2024-10-23 PROCEDURE — 2580000003 HC RX 258: Performed by: FAMILY MEDICINE

## 2024-10-23 PROCEDURE — 6360000002 HC RX W HCPCS: Performed by: FAMILY MEDICINE

## 2024-10-23 PROCEDURE — 36415 COLL VENOUS BLD VENIPUNCTURE: CPT

## 2024-10-23 PROCEDURE — 85018 HEMOGLOBIN: CPT

## 2024-10-23 PROCEDURE — 86901 BLOOD TYPING SEROLOGIC RH(D): CPT

## 2024-10-23 PROCEDURE — 80076 HEPATIC FUNCTION PANEL: CPT

## 2024-10-23 PROCEDURE — 86923 COMPATIBILITY TEST ELECTRIC: CPT

## 2024-10-23 PROCEDURE — 6370000000 HC RX 637 (ALT 250 FOR IP)

## 2024-10-23 PROCEDURE — P9016 RBC LEUKOCYTES REDUCED: HCPCS

## 2024-10-23 PROCEDURE — 36430 TRANSFUSION BLD/BLD COMPNT: CPT

## 2024-10-23 PROCEDURE — 6370000000 HC RX 637 (ALT 250 FOR IP): Performed by: FAMILY MEDICINE

## 2024-10-23 PROCEDURE — 85025 COMPLETE CBC W/AUTO DIFF WBC: CPT

## 2024-10-23 PROCEDURE — 86850 RBC ANTIBODY SCREEN: CPT

## 2024-10-23 PROCEDURE — 99231 SBSQ HOSP IP/OBS SF/LOW 25: CPT | Performed by: STUDENT IN AN ORGANIZED HEALTH CARE EDUCATION/TRAINING PROGRAM

## 2024-10-23 PROCEDURE — 1100000000 HC RM PRIVATE

## 2024-10-23 RX ORDER — SODIUM CHLORIDE 9 MG/ML
INJECTION, SOLUTION INTRAVENOUS PRN
Status: DISCONTINUED | OUTPATIENT
Start: 2024-10-23 | End: 2024-10-25 | Stop reason: HOSPADM

## 2024-10-23 RX ADMIN — PANTOPRAZOLE SODIUM 40 MG: 40 INJECTION, POWDER, FOR SOLUTION INTRAVENOUS at 09:35

## 2024-10-23 RX ADMIN — MIDODRINE HYDROCHLORIDE 10 MG: 5 TABLET ORAL at 17:13

## 2024-10-23 RX ADMIN — ACETAMINOPHEN 650 MG: 325 TABLET ORAL at 13:24

## 2024-10-23 RX ADMIN — OXYCODONE 5 MG: 5 TABLET ORAL at 02:08

## 2024-10-23 RX ADMIN — OXYCODONE 5 MG: 5 TABLET ORAL at 19:17

## 2024-10-23 RX ADMIN — FOLIC ACID TAB 400 MCG 1 MG: 400 TAB at 09:42

## 2024-10-23 RX ADMIN — GABAPENTIN 100 MG: 100 CAPSULE ORAL at 20:40

## 2024-10-23 RX ADMIN — GABAPENTIN 100 MG: 100 CAPSULE ORAL at 09:34

## 2024-10-23 RX ADMIN — MIDODRINE HYDROCHLORIDE 10 MG: 5 TABLET ORAL at 13:24

## 2024-10-23 RX ADMIN — PANTOPRAZOLE SODIUM 40 MG: 40 INJECTION, POWDER, FOR SOLUTION INTRAVENOUS at 20:40

## 2024-10-23 RX ADMIN — Medication 10 ML: at 21:29

## 2024-10-23 RX ADMIN — OXYCODONE 5 MG: 5 TABLET ORAL at 10:55

## 2024-10-23 RX ADMIN — SODIUM CHLORIDE, PRESERVATIVE FREE 10 ML: 5 INJECTION INTRAVENOUS at 09:44

## 2024-10-23 RX ADMIN — ONDANSETRON 4 MG: 2 INJECTION INTRAMUSCULAR; INTRAVENOUS at 09:43

## 2024-10-23 RX ADMIN — THERA TABS 1 TABLET: TAB at 09:34

## 2024-10-23 RX ADMIN — OXYCODONE 5 MG: 5 TABLET ORAL at 06:18

## 2024-10-23 RX ADMIN — MIDODRINE HYDROCHLORIDE 10 MG: 5 TABLET ORAL at 06:18

## 2024-10-23 RX ADMIN — OXYCODONE 5 MG: 5 TABLET ORAL at 15:03

## 2024-10-23 RX ADMIN — GABAPENTIN 100 MG: 100 CAPSULE ORAL at 15:03

## 2024-10-23 RX ADMIN — SODIUM CHLORIDE, PRESERVATIVE FREE 10 ML: 5 INJECTION INTRAVENOUS at 21:29

## 2024-10-23 RX ADMIN — Medication 100 MG: at 09:34

## 2024-10-23 ASSESSMENT — PAIN DESCRIPTION - DESCRIPTORS
DESCRIPTORS: ACHING
DESCRIPTORS: ACHING;CRAMPING

## 2024-10-23 ASSESSMENT — PAIN SCALES - GENERAL
PAINLEVEL_OUTOF10: 8
PAINLEVEL_OUTOF10: 9
PAINLEVEL_OUTOF10: 8
PAINLEVEL_OUTOF10: 7
PAINLEVEL_OUTOF10: 7
PAINLEVEL_OUTOF10: 4
PAINLEVEL_OUTOF10: 8
PAINLEVEL_OUTOF10: 4

## 2024-10-23 ASSESSMENT — PAIN DESCRIPTION - ORIENTATION
ORIENTATION: ANTERIOR
ORIENTATION: RIGHT
ORIENTATION: ANTERIOR
ORIENTATION: ANTERIOR

## 2024-10-23 ASSESSMENT — PAIN DESCRIPTION - LOCATION
LOCATION: HEAD
LOCATION: ABDOMEN;FOOT
LOCATION: ABDOMEN
LOCATION: ABDOMEN

## 2024-10-23 NOTE — PROGRESS NOTES
[facility] LIVER [facility]      Akira Calvo MD, FACP, FACG, FAASLD      BUZZ James, [facility]   Sheeba Roberson, [facility]   Sobeida Emeka, [facility]  Gustavo Kurtz, [facility]   Kathi Tillman, [facility]   Elvia Brownon, [facility]      Liver [facility]   [address], [address]   [place]  [address] [phone]   FAX: [phone]  [facility]   [address], [address]   [place]  [address] [phone]   FAX: [phone]       HEPATOLOGY PROGRESS NOTE  The patient is a 39 y.o. female who has consumed 4-5 alcohol drinks per day for many years.    She also has a h/o IVDA but stopped drug use in 2019.  She is currently on suboxone.  She takes a PPI for GERD    She came to the ED because of abdominal pain.      In the ED Laboratory studies were significant for:    Sna 128, Scr 0.68 mg, , ALT 20, , TBILI 10.5 mg, ALB 3.0 gm, WBC 19.1, HB 8.8 gms, , INR 1.2.    Imaging of the liver with Ultrasound and MRI demonstrated hepatic steatosis consistent with alcohol induced hepatitis, nodular surface to the liver suggesting cirrhosis, no significant bile duct dilation, no liver mass, ascites.    Hospital Course to date:  She did not develop withdrawal.    HypoNA resolved with IV fluids.  She did not need IV albumin    TBILI came down from 10 to 8mg  INR improved with Vitamin K  Alcohol induced hepatitis is still mild and DF remains <32.  No need for steroids  The elevation in ALP is due to cholestatic form of alcoholic hepatitis and coming down    US shows only minimal, ascites.  No paracentesis needed  Now on step 1/2 diuretics    She had melena and drop in HB from 7 to 4.5 gms.  NO hypotension.  She received 2 unit of blood.  EGD by Alyce demonstrated small esophageal varices and 2 small   18   Ht 1.651 m (5' 5\")   Wt 45.8 kg (100 lb 15.5 oz)   SpO2 95%   BMI 16.80 kg/m²     General:  No acute distress.   Eyes:  Sclera icteric  ENT:  No oral lesions.  Thyroid normal.  Nodes:  No adenopathy.   Skin:  No spider angiomata.  Jaundice.  Respiratory:  Lungs clear to auscultation.   Cardiovascular:  Regular heart rate.  Abdomen:  Diffusely tender.  Ascites appears to be present with mild protuberance  Extremities:  No lower extremity edema.    Neurologic:  Alert and oriented.  Cranial nerves grossly intact.  No asterixis.      LABORATORY:  Lab Results   Component Value Date    ALT 13 10/23/2024    AST 97 (H) 10/23/2024    ALKPHOS 266 (H) 10/23/2024    BILITOT 6.7 (H) 10/23/2024     No results found for: \"LABPROT\", \"LABALBU\"  Lab Results   Component Value Date    WBC 11.3 (H) 10/23/2024    HGB 6.7 (L) 10/23/2024    HCT 20.6 (L) 10/23/2024    .5 (H) 10/23/2024     10/23/2024     Lab Results   Component Value Date    INR 1.4 (H) 10/20/2024    INR 1.4 (H) 10/19/2024    INR 1.4 (H) 10/18/2024    PROTIME 14.0 (H) 10/20/2024    PROTIME 14.0 (H) 10/19/2024    PROTIME 14.1 (H) 10/18/2024     Lab Results   Component Value Date    BUN 16 10/22/2024     Lab Results   Component Value Date    CREATININE 0.57 10/22/2024       Kendy Eldridge MD

## 2024-10-23 NOTE — CARE COORDINATION
Transition of Care Plan:    RUR: 12%  Prior Level of Functioning: independent  Disposition: Home  If SNF or IPR: Date FOC offered:   Date FOC received:   Accepting facility:   Date authorization started with reference number:   Date authorization received and expires:   Follow up appointments: per physician recommendation   DME needed: N/A  Transportation at discharge: family  IM/IMM Medicare/ letter given: N/A  Caregiver Contact: Justin Rowan [phone]  Barriers to discharge: medical stability, monitor labs, endoscopy, blood transfusion     CM reviewed chart, noted: Patient explained that she is not working at this point.  Patient gets medications at [facility] on [address] near [facility]. Patient indicated that she will not go to Rehab.  This was discussed with patient.  Patient does not have a history of ETOH rehab. Support was offered. Patient does not use DME in the home.     Hepatology, Gastro, Gen Surg, Hospitalist are following.     Patient discussed in IDRs. Patient may have an endoscopy tomorrow, monitoring labs.     CM to follow for any discharge needs.     Elif Pfeiffer RN/CRM

## 2024-10-23 NOTE — PROGRESS NOTES
[facility] Adult  Hospitalist Group                                                                                          Hospitalist Progress Note  Addis Caraballo, APRN - NP  Answering service: [phone] OR [phone] from in house phone        Date of Service:  10/23/2024  NAME:  Kristie Rowan  :  1985  MRN:  [id number]       Admission Summary:    Kristie Rowan is a  39 y.o. female with apmhx past heroin abuse, and etoh dependence who presents with abdominal pain, n/v, and jaundice.  She was in her usual state of health until 1.5 weeks ago when she developed nausea, vomiting, abdominal pain and bloating.  She denies fever, chills, diarrhea, or dysuria.  She has a remote hx of IV heroin use, and has been sober for 4 years.  She states she tested negative for hep C 4 years ago.  She does drink 3 glasses of vodka or wine daily.  She denies hx withdrawal.      In the ED, she was tachycardic to 118.  Other VSS.  Lab showed WBC 19.1, macrocytic anemia with Hgb 8.8, Na 128, + 3.1, , Alk phos 599, T. Bili 10.5, and albumin 3.  US abdomen showed hepatomegaly with increased liver echogenicity and ascites, and gallbladder sludge with wall thickening an pericholecystic fluid.  MRCP was ordered, and is pending.     In the ED, she received morphine, toradol, and zofran    Interval history / Subjective:   Hgb 6.7, transfuse 1 unit  No further bleeding from nose  SBP's still soft, diuretics held this morning per nursing  Further plans per hepatology    ADDENDUM: 1400 Discussed with hepatology, reporting hematochezia, npo for possible scope tomorrow     Assessment & Plan:     Alcohol Hepatitis with transaminitis  - MRI abd 10/11: Enlarged liver with geographic areas of hepatic steatosis and possible early fibrotic changes. Mild sludge in the gallbladder. No significant gallbladder wall thickening is noted. No biliary dilatation.  - RUQ u/s on 10/14 showed Small volume of ascites.  Paracentesis not  immediate release tablet 5 mg  5 mg Oral Q4H PRN    gabapentin (NEURONTIN) capsule 100 mg  100 mg Oral TID    pantoprazole (PROTONIX) 40 mg in sodium chloride (PF) 0.9 % 10 mL injection  40 mg IntraVENous Q12H    ALPRAZolam (XANAX) tablet 0.25 mg  0.25 mg Oral TID PRN    furosemide (LASIX) tablet 20 mg  20 mg Oral Daily    spironolactone (ALDACTONE) tablet 50 mg  50 mg Oral Daily    0.9 % sodium chloride infusion   IntraVENous PRN    simethicone (MYLICON) chewable tablet 80 mg  80 mg Oral Q6H PRN    0.9 % sodium chloride infusion   IntraVENous PRN    calcium carbonate (TUMS) chewable tablet 500 mg  500 mg Oral TID PRN    naloxone (NARCAN) injection 0.4 mg  0.4 mg IntraVENous PRN    sodium chloride flush 0.9 % injection 5-40 mL  5-40 mL IntraVENous 2 times per day    sodium chloride flush 0.9 % injection 5-40 mL  5-40 mL IntraVENous PRN    0.9 % sodium chloride infusion   IntraVENous PRN    thiamine tablet 100 mg  100 mg Oral Daily    multivitamin 1 tablet  1 tablet Oral Daily    folic acid (FOLVITE) tablet 1 mg  1 mg Oral Daily    morphine (PF) injection 2 mg  2 mg IntraVENous Q4H PRN    sodium chloride flush 0.9 % injection 5-40 mL  5-40 mL IntraVENous 2 times per day    0.9 % sodium chloride infusion   IntraVENous PRN    ondansetron (ZOFRAN-ODT) disintegrating tablet 4 mg  4 mg Oral Q8H PRN    Or    ondansetron (ZOFRAN) injection 4 mg  4 mg IntraVENous Q6H PRN    polyethylene glycol (GLYCOLAX) packet 17 g  17 g Oral Daily PRN    acetaminophen (TYLENOL) tablet 650 mg  650 mg Oral Q6H PRN    Or    acetaminophen (TYLENOL) suppository 650 mg  650 mg Rectal Q6H PRN    sodium chloride flush 0.9 % injection 5-40 mL  5-40 mL IntraVENous BID     ______________________________________________________________________  EXPECTED LENGTH OF STAY: Unable to retrieve estimated LOS  ACTUAL LENGTH OF STAY:          12                 Addis Caraballo, APRN - NP

## 2024-10-24 ENCOUNTER — ANESTHESIA EVENT (OUTPATIENT)
Facility: HOSPITAL | Age: 39
End: 2024-10-24
Payer: MEDICAID

## 2024-10-24 ENCOUNTER — ANESTHESIA (OUTPATIENT)
Facility: HOSPITAL | Age: 39
End: 2024-10-24
Payer: MEDICAID

## 2024-10-24 LAB
ABO + RH BLD: NORMAL
ALBUMIN SERPL-MCNC: 1.8 G/DL (ref 3.5–5)
ALBUMIN/GLOB SERPL: 0.5 (ref 1.1–2.2)
ALP SERPL-CCNC: 256 U/L (ref 45–117)
ALT SERPL-CCNC: 12 U/L (ref 12–78)
ANION GAP SERPL CALC-SCNC: 7 MMOL/L (ref 2–12)
AST SERPL-CCNC: 101 U/L (ref 15–37)
BASOPHILS # BLD: 0.1 K/UL (ref 0–0.1)
BASOPHILS NFR BLD: 1 % (ref 0–1)
BILIRUB SERPL-MCNC: 6.1 MG/DL (ref 0.2–1)
BLD PROD TYP BPU: NORMAL
BLOOD BANK BLOOD PRODUCT EXPIRATION DATE: NORMAL
BLOOD BANK DISPENSE STATUS: NORMAL
BLOOD BANK ISBT PRODUCT BLOOD TYPE: 5100
BLOOD BANK PRODUCT CODE: NORMAL
BLOOD BANK UNIT TYPE AND RH: NORMAL
BLOOD GROUP ANTIBODIES SERPL: NORMAL
BPU ID: NORMAL
BUN SERPL-MCNC: 12 MG/DL (ref 6–20)
BUN/CREAT SERPL: 25 (ref 12–20)
CALCIUM SERPL-MCNC: 8.4 MG/DL (ref 8.5–10.1)
CHLORIDE SERPL-SCNC: 107 MMOL/L (ref 97–108)
CO2 SERPL-SCNC: 24 MMOL/L (ref 21–32)
CREAT SERPL-MCNC: 0.48 MG/DL (ref 0.55–1.02)
CROSSMATCH RESULT: NORMAL
DIFFERENTIAL METHOD BLD: ABNORMAL
EOSINOPHIL # BLD: 0.1 K/UL (ref 0–0.4)
EOSINOPHIL NFR BLD: 1 % (ref 0–7)
ERYTHROCYTE [DISTWIDTH] IN BLOOD BY AUTOMATED COUNT: 25 % (ref 11.5–14.5)
GLOBULIN SER CALC-MCNC: 3.7 G/DL (ref 2–4)
GLUCOSE SERPL-MCNC: 96 MG/DL (ref 65–100)
HCG UR QL: NEGATIVE
HCT VFR BLD AUTO: 23.2 % (ref 35–47)
HGB BLD-MCNC: 7.8 G/DL (ref 11.5–16)
IMM GRANULOCYTES # BLD AUTO: 0.1 K/UL (ref 0–0.04)
IMM GRANULOCYTES NFR BLD AUTO: 1 % (ref 0–0.5)
LYMPHOCYTES # BLD: 1 K/UL (ref 0.8–3.5)
LYMPHOCYTES NFR BLD: 9 % (ref 12–49)
MCH RBC QN AUTO: 33.5 PG (ref 26–34)
MCHC RBC AUTO-ENTMCNC: 33.6 G/DL (ref 30–36.5)
MCV RBC AUTO: 99.6 FL (ref 80–99)
MONOCYTES # BLD: 0.8 K/UL (ref 0–1)
MONOCYTES NFR BLD: 7 % (ref 5–13)
NEUTS SEG # BLD: 8.7 K/UL (ref 1.8–8)
NEUTS SEG NFR BLD: 81 % (ref 32–75)
NRBC # BLD: 0 K/UL (ref 0–0.01)
NRBC BLD-RTO: 0 PER 100 WBC
PLATELET # BLD AUTO: 157 K/UL (ref 150–400)
PMV BLD AUTO: 10.5 FL (ref 8.9–12.9)
POTASSIUM SERPL-SCNC: 3.2 MMOL/L (ref 3.5–5.1)
PROT SERPL-MCNC: 5.5 G/DL (ref 6.4–8.2)
RBC # BLD AUTO: 2.33 M/UL (ref 3.8–5.2)
RBC MORPH BLD: ABNORMAL
RBC MORPH BLD: ABNORMAL
SODIUM SERPL-SCNC: 138 MMOL/L (ref 136–145)
SPECIMEN EXP DATE BLD: NORMAL
UNIT DIVISION: 0
UNIT ISSUE DATE/TIME: NORMAL
WBC # BLD AUTO: 10.8 K/UL (ref 3.6–11)
WBC MORPH BLD: ABNORMAL

## 2024-10-24 PROCEDURE — 6360000002 HC RX W HCPCS: Performed by: PHYSICIAN ASSISTANT

## 2024-10-24 PROCEDURE — 6370000000 HC RX 637 (ALT 250 FOR IP): Performed by: NURSE PRACTITIONER

## 2024-10-24 PROCEDURE — 80053 COMPREHEN METABOLIC PANEL: CPT

## 2024-10-24 PROCEDURE — 3700000000 HC ANESTHESIA ATTENDED CARE: Performed by: STUDENT IN AN ORGANIZED HEALTH CARE EDUCATION/TRAINING PROGRAM

## 2024-10-24 PROCEDURE — 2580000003 HC RX 258: Performed by: FAMILY MEDICINE

## 2024-10-24 PROCEDURE — 43235 EGD DIAGNOSTIC BRUSH WASH: CPT | Performed by: STUDENT IN AN ORGANIZED HEALTH CARE EDUCATION/TRAINING PROGRAM

## 2024-10-24 PROCEDURE — 7100000010 HC PHASE II RECOVERY - FIRST 15 MIN: Performed by: STUDENT IN AN ORGANIZED HEALTH CARE EDUCATION/TRAINING PROGRAM

## 2024-10-24 PROCEDURE — 3600007512: Performed by: STUDENT IN AN ORGANIZED HEALTH CARE EDUCATION/TRAINING PROGRAM

## 2024-10-24 PROCEDURE — 6370000000 HC RX 637 (ALT 250 FOR IP): Performed by: FAMILY MEDICINE

## 2024-10-24 PROCEDURE — 0DJ08ZZ INSPECTION OF UPPER INTESTINAL TRACT, VIA NATURAL OR ARTIFICIAL OPENING ENDOSCOPIC: ICD-10-PCS | Performed by: STUDENT IN AN ORGANIZED HEALTH CARE EDUCATION/TRAINING PROGRAM

## 2024-10-24 PROCEDURE — 2580000003 HC RX 258: Performed by: PHYSICIAN ASSISTANT

## 2024-10-24 PROCEDURE — 7100000011 HC PHASE II RECOVERY - ADDTL 15 MIN: Performed by: STUDENT IN AN ORGANIZED HEALTH CARE EDUCATION/TRAINING PROGRAM

## 2024-10-24 PROCEDURE — 3600007502: Performed by: STUDENT IN AN ORGANIZED HEALTH CARE EDUCATION/TRAINING PROGRAM

## 2024-10-24 PROCEDURE — 2580000003 HC RX 258

## 2024-10-24 PROCEDURE — 2720000010 HC SURG SUPPLY STERILE: Performed by: STUDENT IN AN ORGANIZED HEALTH CARE EDUCATION/TRAINING PROGRAM

## 2024-10-24 PROCEDURE — 6370000000 HC RX 637 (ALT 250 FOR IP)

## 2024-10-24 PROCEDURE — 88305 TISSUE EXAM BY PATHOLOGIST: CPT

## 2024-10-24 PROCEDURE — 2709999900 HC NON-CHARGEABLE SUPPLY: Performed by: STUDENT IN AN ORGANIZED HEALTH CARE EDUCATION/TRAINING PROGRAM

## 2024-10-24 PROCEDURE — 81025 URINE PREGNANCY TEST: CPT

## 2024-10-24 PROCEDURE — 3700000001 HC ADD 15 MINUTES (ANESTHESIA): Performed by: STUDENT IN AN ORGANIZED HEALTH CARE EDUCATION/TRAINING PROGRAM

## 2024-10-24 PROCEDURE — 6360000002 HC RX W HCPCS: Performed by: NURSE ANESTHETIST, CERTIFIED REGISTERED

## 2024-10-24 PROCEDURE — 85025 COMPLETE CBC W/AUTO DIFF WBC: CPT

## 2024-10-24 PROCEDURE — 2580000003 HC RX 258: Performed by: NURSE ANESTHETIST, CERTIFIED REGISTERED

## 2024-10-24 PROCEDURE — 1100000000 HC RM PRIVATE

## 2024-10-24 RX ORDER — SODIUM CHLORIDE 9 MG/ML
25 INJECTION, SOLUTION INTRAVENOUS PRN
Status: DISCONTINUED | OUTPATIENT
Start: 2024-10-24 | End: 2024-10-24 | Stop reason: HOSPADM

## 2024-10-24 RX ORDER — SODIUM CHLORIDE 0.9 % (FLUSH) 0.9 %
5-40 SYRINGE (ML) INJECTION PRN
Status: DISCONTINUED | OUTPATIENT
Start: 2024-10-24 | End: 2024-10-24 | Stop reason: HOSPADM

## 2024-10-24 RX ORDER — SODIUM CHLORIDE 0.9 % (FLUSH) 0.9 %
5-40 SYRINGE (ML) INJECTION EVERY 12 HOURS SCHEDULED
Status: DISCONTINUED | OUTPATIENT
Start: 2024-10-24 | End: 2024-10-24 | Stop reason: HOSPADM

## 2024-10-24 RX ORDER — 0.9 % SODIUM CHLORIDE 0.9 %
INTRAVENOUS SOLUTION INTRAVENOUS
Status: DISCONTINUED | OUTPATIENT
Start: 2024-10-24 | End: 2024-10-24 | Stop reason: SDUPTHER

## 2024-10-24 RX ADMIN — Medication 100 MG: at 08:16

## 2024-10-24 RX ADMIN — MIDODRINE HYDROCHLORIDE 10 MG: 5 TABLET ORAL at 16:08

## 2024-10-24 RX ADMIN — SODIUM CHLORIDE 10 ML: 9 INJECTION, SOLUTION INTRAVENOUS at 14:09

## 2024-10-24 RX ADMIN — MIDODRINE HYDROCHLORIDE 10 MG: 5 TABLET ORAL at 08:16

## 2024-10-24 RX ADMIN — GABAPENTIN 100 MG: 100 CAPSULE ORAL at 13:02

## 2024-10-24 RX ADMIN — PANTOPRAZOLE SODIUM 40 MG: 40 INJECTION, POWDER, FOR SOLUTION INTRAVENOUS at 20:20

## 2024-10-24 RX ADMIN — OXYCODONE 5 MG: 5 TABLET ORAL at 06:30

## 2024-10-24 RX ADMIN — PANTOPRAZOLE SODIUM 40 MG: 40 INJECTION, POWDER, FOR SOLUTION INTRAVENOUS at 08:17

## 2024-10-24 RX ADMIN — OXYCODONE 5 MG: 5 TABLET ORAL at 20:20

## 2024-10-24 RX ADMIN — THERA TABS 1 TABLET: TAB at 08:17

## 2024-10-24 RX ADMIN — FOLIC ACID TAB 400 MCG 1 MG: 400 TAB at 08:16

## 2024-10-24 RX ADMIN — SODIUM CHLORIDE, PRESERVATIVE FREE 10 ML: 5 INJECTION INTRAVENOUS at 20:20

## 2024-10-24 RX ADMIN — GABAPENTIN 100 MG: 100 CAPSULE ORAL at 20:19

## 2024-10-24 RX ADMIN — SODIUM CHLORIDE, PRESERVATIVE FREE 10 ML: 5 INJECTION INTRAVENOUS at 08:17

## 2024-10-24 RX ADMIN — OXYCODONE 5 MG: 5 TABLET ORAL at 16:09

## 2024-10-24 RX ADMIN — SODIUM CHLORIDE 10 ML: 9 INJECTION, SOLUTION INTRAVENOUS at 14:05

## 2024-10-24 RX ADMIN — PROPOFOL 50 MG: 10 INJECTION, EMULSION INTRAVENOUS at 14:08

## 2024-10-24 RX ADMIN — OXYCODONE 5 MG: 5 TABLET ORAL at 01:26

## 2024-10-24 RX ADMIN — OXYCODONE 5 MG: 5 TABLET ORAL at 12:02

## 2024-10-24 RX ADMIN — MIDODRINE HYDROCHLORIDE 10 MG: 5 TABLET ORAL at 11:37

## 2024-10-24 RX ADMIN — SODIUM CHLORIDE, PRESERVATIVE FREE 10 ML: 5 INJECTION INTRAVENOUS at 21:37

## 2024-10-24 RX ADMIN — GABAPENTIN 100 MG: 100 CAPSULE ORAL at 08:16

## 2024-10-24 RX ADMIN — PROPOFOL 100 MG: 10 INJECTION, EMULSION INTRAVENOUS at 14:05

## 2024-10-24 ASSESSMENT — PAIN DESCRIPTION - DESCRIPTORS
DESCRIPTORS: ACHING
DESCRIPTORS: CRAMPING
DESCRIPTORS: ACHING
DESCRIPTORS: CRAMPING
DESCRIPTORS: ACHING
DESCRIPTORS: CRAMPING
DESCRIPTORS: ACHING

## 2024-10-24 ASSESSMENT — PAIN DESCRIPTION - LOCATION
LOCATION: ABDOMEN

## 2024-10-24 ASSESSMENT — PAIN SCALES - GENERAL
PAINLEVEL_OUTOF10: 8
PAINLEVEL_OUTOF10: 3
PAINLEVEL_OUTOF10: 9
PAINLEVEL_OUTOF10: 5
PAINLEVEL_OUTOF10: 8
PAINLEVEL_OUTOF10: 6
PAINLEVEL_OUTOF10: 7
PAINLEVEL_OUTOF10: 9
PAINLEVEL_OUTOF10: 6
PAINLEVEL_OUTOF10: 7
PAINLEVEL_OUTOF10: 6
PAINLEVEL_OUTOF10: 7
PAINLEVEL_OUTOF10: 6
PAINLEVEL_OUTOF10: 4
PAINLEVEL_OUTOF10: 5

## 2024-10-24 ASSESSMENT — PAIN DESCRIPTION - ORIENTATION
ORIENTATION: ANTERIOR

## 2024-10-24 NOTE — H&P
[facility]      Akira Calvo MD, FACP, FACG, FAASLD  MD Silvia Robert, BUZZ De Anda, [facility]   Sheeba Roberson, [facility]   Sobeida Harperosta, [facility]  Gustavo Kurtz, [facility]   Kathi Layne, [facility]   Elvia Coley, [facility]   [address], [address]   [place]  [address] [phone]   FAX: [phone]  [facility]   [address], [address]   [place]  [address] [phone]   FAX: [phone]         UPPER ENDOSCOPY PROCEDURE NOTE    NAME: Kristie Rowan  :  1985  MRN:  [id number]    INDICATION: Cirrhosis.  Screening for esophageal varices with variceal ligation if  indicated.    : Kendy Eldridge MD    SURGICAL ASSISTANT:  None    PROSTHETIC DEVISES, TISSUE GRAFTS, ORGAN TRANSPLANTS:  Not applicable     ANESTHESIA/SEDATION: MAC Sedation per anesthesiology         PROCEDURE DESCRIPTION:  [employer] consent was obtained from the patient for the procedure.  All risks and benefits of the procedure explained.     The procedure was performed in the endoscopy suite.  The patient was laying on a stretcher and moved to the left lateral decubitus position prior to administration of sedation in the procedure room.      Sedation was administered by anesthesiology.  See their note for details.      The endoscope was inserted into the mouth and advanced under direct vision to the second portion of the duodenum.  Careful inspection of upper gastrointestinal tract was made as the endoscope was inserted and withdrawn.  Retroflexion of the endoscope to view of the cardia of the stomach was performed.  The findings and interventions are described below.      FINDINGS:  Esophagus:    Normal.    Small if any varices.  Z line regular at 35 cm distal to

## 2024-10-24 NOTE — PLAN OF CARE
Problem: Discharge Planning  Goal: Discharge to home or other facility with appropriate resources  10/23/2024 2159 by Levy Cleary, RN  Outcome: Progressing  Flowsheets (Taken 10/23/2024 2045)  Discharge to home or other facility with appropriate resources: Identify barriers to discharge with patient and caregiver  10/23/2024 1307 by Osman Crespo RN  Outcome: Not Progressing     Problem: Pain  Goal: Verbalizes/displays adequate comfort level or baseline comfort level  10/23/2024 2159 by Levy Cleary, RN  Outcome: Progressing  10/23/2024 1307 by Osman Crespo, RN  Outcome: Not Progressing     Problem: Safety - Adult  Goal: Free from fall injury  10/23/2024 2159 by Levy Cleary, RN  Outcome: Progressing  10/23/2024 1307 by Osman Crespo, RN  Outcome: Not Progressing

## 2024-10-24 NOTE — PROGRESS NOTES
[facility]      Akira Calvo MD, FACP, FACG, FAASLD      BUZZ James, [facility]   Sheeba Obandowilliamonur, [facility]   Sobeida Emeka, [facility]  Gustavo Kurtz, [facility]   Kathi Tillman, [facility]   Elvia Brownon, [facility]      Liver [facility]   [address]   [place]  [address] [phone]   FAX: [phone]  [facility]   [address]   [place]  [address] [phone]   FAX: [phone]       HEPATOLOGY PROGRESS NOTE  The patient is a 39 y.o. female who has consumed 4-5 alcohol drinks per day for many years.    She also has a h/o IVDA but stopped drug use in 2019.  She is currently on suboxone.  She takes a PPI for GERD    She came to the ED because of abdominal pain.      In the ED Laboratory studies were significant for:    Sna 128, Scr 0.68 mg, , ALT 20, , TBILI 10.5 mg, ALB 3.0 gm, WBC 19.1, HB 8.8 gms, , INR 1.2.    Imaging of the liver with Ultrasound and MRI demonstrated hepatic steatosis consistent with alcohol induced hepatitis, nodular surface to the liver suggesting cirrhosis, no significant bile duct dilation, no liver mass, ascites.    Hospital Course to date:  She did not develop withdrawal.    HypoNA resolved with IV fluids.  She did not need IV albumin    TBILI continues to trend down  INR improved with Vitamin K  Steroids not administered.     US shows only minimal, ascites.  No paracentesis needed  Lasix 20 mg dialy/Spironolactone 50 mg daily    Developed melena and drop in HB from 7 to 4.5 gms.  NO hypotension.  She received 2 unit of blood.  EGD by Alyce demonstrated small esophageal varices and 2 small  which were likely the site of bleeding.  Now on a PPI    She required a unit of PRBC on 10/23 and reported small episodes

## 2024-10-24 NOTE — ANESTHESIA POSTPROCEDURE EVALUATION
Department of Anesthesiology  Postprocedure Note    Patient: Kristie Rowan  MRN: [id number]  YOB: 1985  Date of evaluation: 10/24/2024    Procedure Summary       Date: 10/24/24 Room / Location: [facility] ENDO 04 / [facility] ENDOSCOPY    Anesthesia Start: 1402 Anesthesia Stop: 1422    Procedure: ESOPHAGOGASTRODUODENOSCOPY (Upper GI Region) Diagnosis:       Melena      (Melena [K92.1])    Surgeons: Kendy Eldridge MD Responsible Provider: Alfredo Pro MD    Anesthesia Type: MAC ASA Status: 3            Anesthesia Type: No value filed.    Janneth Phase I: Janneth Score: 10    Janneth Phase II: Janneth Score: 10    Anesthesia Post Evaluation    Patient location during evaluation: PACU  Patient participation: complete - patient participated  Level of consciousness: awake  Airway patency: patent  Nausea & Vomiting: no nausea  Cardiovascular status: blood pressure returned to baseline and hemodynamically stable  Respiratory status: acceptable  Hydration status: stable  Multimodal analgesia pain management approach    No notable events documented.

## 2024-10-24 NOTE — PLAN OF CARE
Problem: Discharge Planning  Goal: Discharge to home or other facility with appropriate resources  10/24/2024 0918 by Brit Corbett RN  Outcome: Progressing  Flowsheets (Taken 10/24/2024 0754)  Discharge to home or other facility with appropriate resources: Identify discharge learning needs (meds, wound care, etc)  10/23/2024 2159 by Levy Cleary, RN  Outcome: Progressing  Flowsheets (Taken 10/23/2024 2045)  Discharge to home or other facility with appropriate resources: Identify barriers to discharge with patient and caregiver     Problem: Pain  Goal: Verbalizes/displays adequate comfort level or baseline comfort level  10/24/2024 0918 by Brit Corbett RN  Outcome: Progressing  10/23/2024 2159 by Levy Cleary, RN  Outcome: Progressing     Problem: Safety - Adult  Goal: Free from fall injury  10/24/2024 0918 by Brit Corbett RN  Outcome: Progressing  10/23/2024 2159 by Levy Cleary, RN  Outcome: Progressing     Problem: Nutrition Deficit:  Goal: Optimize nutritional status  10/24/2024 0918 by Brit Corbett RN  Outcome: Progressing  10/23/2024 2159 by Levy Cleary, RN  Outcome: Progressing

## 2024-10-24 NOTE — PROGRESS NOTES
Assessment No symptoms 10/24/24 0916   Infiltration Assessment 0 10/24/24 0916   Alcohol Cap Used Yes 10/24/24 0916   Dressing Status Clean, dry & intact 10/24/24 0916   Dressing Type Transparent 10/24/24 0916   Dressing Intervention New 10/11/24 1621       Peripheral IV 10/23/24 Left;Anterior Forearm (Active)   Site Assessment Clean, dry & intact 10/24/24 0916   Line Status Flushed;Normal saline locked 10/24/24 0916   Line Care Connections checked and tightened;Ports disinfected 10/24/24 0916   Phlebitis Assessment No symptoms 10/24/24 0916   Infiltration Assessment 0 10/24/24 0916   Alcohol Cap Used Yes 10/24/24 0916   Dressing Status Clean, dry & intact 10/24/24 0916   Dressing Type Transparent 10/24/24 0916        Opportunity for questions and clarification was provided.      Patient transported with:  Tech

## 2024-10-24 NOTE — ANESTHESIA PRE PROCEDURE
Department of Anesthesiology  Preprocedure Note       Name:  Kristie Rowan   Age:  39 y.o.  :  1985                                          MRN:  [id number]         Date:  10/24/2024      Surgeon: Surgeon(s):  Kendy Eldridge MD    Procedure: Procedure(s):  ESOPHAGOGASTRODUODENOSCOPY    Medications prior to admission:   Prior to Admission medications    Medication Sig Start Date End Date Taking? Authorizing Provider   Buprenorphine HCl-Naloxone HCl (SUBOXONE SL) Place under the tongue   Yes Provider, MD Lakshmi       Current medications:    Current Facility-Administered Medications   Medication Dose Route Frequency Provider Last Rate Last Admin   • sodium chloride flush 0.9 % injection 5-40 mL  5-40 mL IntraVENous 2 times per day Kendy Eldridge MD       • sodium chloride flush 0.9 % injection 5-40 mL  5-40 mL IntraVENous PRN Kendy Eldridge MD       • 0.9 % sodium chloride infusion  25 mL IntraVENous PRN Kendy Eldridge MD       • 0.9 % sodium chloride infusion   IntraVENous PRN Addis Caraballo APRN - NP       • midodrine (PROAMATINE) tablet 10 mg  10 mg Oral TID WC Anu Henriquez PA-C   10 mg at 10/24/24 1137   • sodium chloride flush 0.9 % injection 5-40 mL  5-40 mL IntraVENous 2 times per day Anu Henriquez PA-C   10 mL at 10/24/24 0817   • sodium chloride flush 0.9 % injection 5-40 mL  5-40 mL IntraVENous PRN Anu Henriquez PA-C       • 0.9 % sodium chloride infusion   IntraVENous PRN Anu Henriquez PA-C 100 mL/hr at 10/20/24 1517 25 mL at 10/20/24 1517   • oxyCODONE (ROXICODONE) immediate release tablet 5 mg  5 mg Oral Q4H PRN Bao Landaverde APRN - NP   5 mg at 10/24/24 1202   • gabapentin (NEURONTIN) capsule 100 mg  100 mg Oral TID Yanni Cerna APRN - CNP   100 mg at 10/24/24 1302   • pantoprazole (PROTONIX) 40 mg in sodium chloride (PF) 0.9 % 10 mL injection  40 mg IntraVENous Q12H Nigel Brannon PA   40 mg at 10/24/24 0817   • ALPRAZolam (XANAX) tablet 0.25 mg  0.25 mg

## 2024-10-24 NOTE — H&P
[facility]      Akira Calvo MD, FACP, FACG, FAASLD  MD Silvia Carney PA-C April S Ashworth, [facility]   Sheeba Roberson, [facility]   Sobeida Harperosta, [facility]  Gustavo Kurtz, [facility]   Kathi Tillman, [facility]   Elvia Coley, [facility]      Liver [facility]   [address]   [place]  [address] [phone]   FAX: [phone]  [facility]   [address]   [place]  [address] [phone]   FAX: [phone]         PRE-PROCEDURE NOTE - EGD    H and P from last office visit reviewed.    Allergies reviewed.  Out-patient medicaton list reviewed.    Patient Active Problem List   Diagnosis    Jaundice    Alcohol use disorder, moderate, dependence (HCC)    Alcoholic hepatitis with ascites    Ascites due to alcoholic hepatitis    Hyponatremia    Upper GI bleeding    Acute blood loss anemia         No Known Allergies    No current facility-administered medications on file prior to encounter.     No current outpatient medications on file prior to encounter.       For EGD to assess for esophageal and gastric varices.  Plan to perform banding if indicated based upon variceal size and appearance.    PHYSICAL EXAMINATION:  Vital signs per nursing and anesthesia records      General: No acute distress.   Eyes: Sclera anicteric.   ENT: No oral lesions.  Thyroid normal.  Nodes: No adenopathy.   Skin: No spider angiomata.  No jaundice.  No palmar erythema.  Respiratory: Lungs clear to auscultation.   Cardiovascular: Regular heart rate.  No murmurs.  No JVD.  Abdomen: Soft non-tender, liver size normal to percussion/palpation.  Spleen not palpable. Mild protuberance  Extremities: No edema.  No muscle wasting.  No gross arthritic changes.  Neurologic: Alert and oriented.  Cranial nerves

## 2024-10-24 NOTE — CONSULTS
[facility]   [address] [address]       GI PROGRESS NOTE  Cinthia Alcantara PA-C  [phone] office  NP/PA in-hospital M-F until 4:30PM  After 5PM or on weekends, please call  for physician on call      NAME: Kristie Rowan   :  1985   MRN:  [id number]       Subjective:     Patient was seen earlier this admission by our team for anemia/melena. She underwent EGD 10/16/24 showing Small varices without stigmata of recent hemorrhage in the esophagus, 2 clean-based ulcers with greatest diameter of 15 mm in the pre-pyloric antrum status post biopsies, mild portal hypertensive gastropathy, and a normal duodenum.    Per chart review, pt has had multiple blood transfusions. She was unable to tolerate lasix and spironolactone due to hypotension, currently on midodrine.     We have been re-consulted for persistent anemia and recent hematochezia/melena. The patient reports the bright red blood has stopped and is still experiencing black, tarry, loose stools about twice per day. Endorses abdominal pain, describing her pain as a stretching of her abdomen.  Has been NPO today.     Objective:     VITALS:   Last 24hrs VS reviewed since prior progress note. Most recent are:  Vitals:    10/24/24 0815   BP: (!) 100/59   Pulse:    Resp:    Temp:    SpO2:        PHYSICAL EXAM:  General: Cooperative, no acute distress    Neurologic:  Alert and oriented X 3.  HEENT: EOMI, + scleral icterus   Lungs:  CTA bilaterally. No wheezing  Heart:  S1 S2, regular rhythm  Abdomen: Soft, moderately distended, + tenderness. +Bowel sounds  Extremities: No edema  Psych:   Good insight. Not anxious or agitated.    Lab Data Reviewed:     Recent Results (from the past 24 hour(s))   Extra Tubes Hold    Collection Time: 10/23/24  5:24 PM   Result Value Ref Range    Specimen HOld 1LAV     Comment:        Add-on orders for these samples will be processed based on acceptable specimen integrity and analyte  stability, which may vary by analyte.   Hemoglobin and Hematocrit    Collection Time: 10/23/24  5:24 PM   Result Value Ref Range    Hemoglobin 8.4 (L) 11.5 - 16.0 g/dL    Hematocrit 25.2 (L) 35.0 - 47.0 %   CBC with Auto Differential    Collection Time: 10/24/24  6:18 AM   Result Value Ref Range    WBC 10.8 3.6 - 11.0 K/uL    RBC 2.33 (L) 3.80 - 5.20 M/uL    Hemoglobin 7.8 (L) 11.5 - 16.0 g/dL    Hematocrit 23.2 (L) 35.0 - 47.0 %    MCV 99.6 (H) 80.0 - 99.0 FL    MCH 33.5 26.0 - 34.0 PG    MCHC 33.6 30.0 - 36.5 g/dL    RDW 25.0 (H) 11.5 - 14.5 %    Platelets 157 150 - 400 K/uL    MPV 10.5 8.9 - 12.9 FL    Nucleated RBCs 0.0 0  WBC    nRBC 0.00 0.00 - 0.01 K/uL    Neutrophils % 81 (H) 32 - 75 %    Lymphocytes % 9 (L) 12 - 49 %    Monocytes % 7 5 - 13 %    Eosinophils % 1 0 - 7 %    Basophils % 1 0 - 1 %    Immature Granulocytes % 1 (H) 0.0 - 0.5 %    Neutrophils Absolute 8.7 (H) 1.8 - 8.0 K/UL    Lymphocytes Absolute 1.0 0.8 - 3.5 K/UL    Monocytes Absolute 0.8 0.0 - 1.0 K/UL    Eosinophils Absolute 0.1 0.0 - 0.4 K/UL    Basophils Absolute 0.1 0.0 - 0.1 K/UL    Immature Granulocytes Absolute 0.1 (H) 0.00 - 0.04 K/UL    Differential Type SMEAR SCANNED      RBC Comment ANISOCYTOSIS  3+        RBC Comment POIKILOCYTOSIS  PRESENT        WBC Comment REACTIVE LYMPHS     Comprehensive Metabolic Panel    Collection Time: 10/24/24  6:18 AM   Result Value Ref Range    Sodium 138 136 - 145 mmol/L    Potassium 3.2 (L) 3.5 - 5.1 mmol/L    Chloride 107 97 - 108 mmol/L    CO2 24 21 - 32 mmol/L    Anion Gap 7 2 - 12 mmol/L    Glucose 96 65 - 100 mg/dL    BUN 12 6 - 20 MG/DL    Creatinine 0.48 (L) 0.55 - 1.02 MG/DL    BUN/Creatinine Ratio 25 (H) 12 - 20      Est, Glom Filt Rate >90 >60 ml/min/1.73m2    Calcium 8.4 (L) 8.5 - 10.1 MG/DL    Total Bilirubin 6.1 (H) 0.2 - 1.0 MG/DL    ALT 12 12 - 78 U/L     (H) 15 - 37 U/L    Alk Phosphatase 256 (H) 45 - 117 U/L    Total Protein 5.5 (L) 6.4 - 8.2 g/dL    Albumin 1.8 (L) 3.5

## 2024-10-24 NOTE — PROGRESS NOTES
[facility]                                                                                          Hospitalist Progress Note  Anu Henriquez PA-C  Answering service: [phone] OR [phone] from in house phone        Date of Service:  10/24/2024  NAME:  Kristie Rowan  :  1985  MRN:  [id number]       Admission Summary:    Kristie Rowan is a  39 y.o. female with apmhx past heroin abuse, and etoh dependence who presents with abdominal pain, n/v, and jaundice.  She was in her usual state of health until 1.5 weeks ago when she developed nausea, vomiting, abdominal pain and bloating.  She denies fever, chills, diarrhea, or dysuria.  She has a remote hx of IV heroin use, and has been sober for 4 years.  She states she tested negative for hep C 4 years ago.  She does drink 3 glasses of vodka or wine daily.  She denies hx withdrawal.      In the ED, she was tachycardic to 118.  Other VSS.  Lab showed WBC 19.1, macrocytic anemia with Hgb 8.8, Na 128, + 3.1, , Alk phos 599, T. Bili 10.5, and albumin 3.  US abdomen showed hepatomegaly with increased liver echogenicity and ascites, and gallbladder sludge with wall thickening an pericholecystic fluid.  MRCP was ordered, and is pending.     In the ED, she received morphine, toradol, and zofran    Interval history / Subjective:       Patient seen and examined this am. Complains of continued abd pain, frustrated with the possibility of not being able to take her Oxycodone if BP too low. Documented hematochezia yesterday. When asked, pt states she had a BM last night and this morning that were dark black. Discussed possible colonoscopy with GI given episode of hematochezia, hypotension, and anemia. GI does not recommend colonoscopy at this time. Discussed with hepatology, will plan for EGD today.        Assessment & Plan:     Alcohol Hepatitis with transaminitis  - MRI abd 10/11: Enlarged liver with geographic areas of

## 2024-10-25 ENCOUNTER — APPOINTMENT (OUTPATIENT)
Facility: HOSPITAL | Age: 39
End: 2024-10-25
Payer: MEDICAID

## 2024-10-25 VITALS
WEIGHT: 100.97 LBS | RESPIRATION RATE: 12 BRPM | DIASTOLIC BLOOD PRESSURE: 71 MMHG | TEMPERATURE: 99.3 F | SYSTOLIC BLOOD PRESSURE: 103 MMHG | BODY MASS INDEX: 16.82 KG/M2 | HEIGHT: 65 IN | OXYGEN SATURATION: 94 % | HEART RATE: 95 BPM

## 2024-10-25 PROBLEM — K70.11 ASCITES DUE TO ALCOHOLIC HEPATITIS: Status: RESOLVED | Noted: 2024-10-12 | Resolved: 2024-10-25

## 2024-10-25 PROBLEM — D62 ACUTE BLOOD LOSS ANEMIA: Status: RESOLVED | Noted: 2024-10-16 | Resolved: 2024-10-25

## 2024-10-25 PROBLEM — K92.2 UPPER GI BLEEDING: Status: RESOLVED | Noted: 2024-10-16 | Resolved: 2024-10-25

## 2024-10-25 PROBLEM — R17 JAUNDICE: Status: RESOLVED | Noted: 2024-10-11 | Resolved: 2024-10-25

## 2024-10-25 LAB
ANION GAP SERPL CALC-SCNC: 8 MMOL/L (ref 2–12)
APPEARANCE UR: CLEAR
BACTERIA URNS QL MICRO: NEGATIVE /HPF
BASOPHILS # BLD: 0.1 K/UL (ref 0–0.1)
BASOPHILS NFR BLD: 1 % (ref 0–1)
BILIRUB UR QL CFM: POSITIVE
BUN SERPL-MCNC: 13 MG/DL (ref 6–20)
BUN/CREAT SERPL: 32 (ref 12–20)
CALCIUM SERPL-MCNC: 8.4 MG/DL (ref 8.5–10.1)
CHLORIDE SERPL-SCNC: 107 MMOL/L (ref 97–108)
CO2 SERPL-SCNC: 23 MMOL/L (ref 21–32)
COLOR UR: ABNORMAL
CREAT SERPL-MCNC: 0.41 MG/DL (ref 0.55–1.02)
DIFFERENTIAL METHOD BLD: ABNORMAL
EOSINOPHIL # BLD: 0.1 K/UL (ref 0–0.4)
EOSINOPHIL NFR BLD: 1 % (ref 0–7)
EPITH CASTS URNS QL MICRO: ABNORMAL /LPF
ERYTHROCYTE [DISTWIDTH] IN BLOOD BY AUTOMATED COUNT: 25 % (ref 11.5–14.5)
GLUCOSE SERPL-MCNC: 76 MG/DL (ref 65–100)
GLUCOSE UR STRIP.AUTO-MCNC: NEGATIVE MG/DL
HCT VFR BLD AUTO: 23.6 % (ref 35–47)
HGB BLD-MCNC: 7.7 G/DL (ref 11.5–16)
HGB UR QL STRIP: NEGATIVE
IMM GRANULOCYTES # BLD AUTO: 0.1 K/UL (ref 0–0.04)
IMM GRANULOCYTES NFR BLD AUTO: 1 % (ref 0–0.5)
KETONES UR QL STRIP.AUTO: NEGATIVE MG/DL
LEUKOCYTE ESTERASE UR QL STRIP.AUTO: ABNORMAL
LYMPHOCYTES # BLD: 1 K/UL (ref 0.8–3.5)
LYMPHOCYTES NFR BLD: 9 % (ref 12–49)
MCH RBC QN AUTO: 32.9 PG (ref 26–34)
MCHC RBC AUTO-ENTMCNC: 32.6 G/DL (ref 30–36.5)
MCV RBC AUTO: 100.9 FL (ref 80–99)
MONOCYTES # BLD: 0.9 K/UL (ref 0–1)
MONOCYTES NFR BLD: 8 % (ref 5–13)
MUCOUS THREADS URNS QL MICRO: ABNORMAL /LPF
NEUTS SEG # BLD: 9.2 K/UL (ref 1.8–8)
NEUTS SEG NFR BLD: 80 % (ref 32–75)
NITRITE UR QL STRIP.AUTO: POSITIVE
NRBC # BLD: 0 K/UL (ref 0–0.01)
NRBC BLD-RTO: 0 PER 100 WBC
PH UR STRIP: 6 (ref 5–8)
PLATELET # BLD AUTO: 181 K/UL (ref 150–400)
PLATELET COMMENT: ABNORMAL
PMV BLD AUTO: 10.9 FL (ref 8.9–12.9)
POTASSIUM SERPL-SCNC: 3.3 MMOL/L (ref 3.5–5.1)
PROT UR STRIP-MCNC: ABNORMAL MG/DL
RBC # BLD AUTO: 2.34 M/UL (ref 3.8–5.2)
RBC #/AREA URNS HPF: ABNORMAL /HPF (ref 0–5)
RBC MORPH BLD: ABNORMAL
SODIUM SERPL-SCNC: 138 MMOL/L (ref 136–145)
SP GR UR REFRACTOMETRY: 1.02 (ref 1–1.03)
URINE CULTURE IF INDICATED: ABNORMAL
UROBILINOGEN UR QL STRIP.AUTO: 1 EU/DL (ref 0.2–1)
WBC # BLD AUTO: 11.4 K/UL (ref 3.6–11)
WBC URNS QL MICRO: ABNORMAL /HPF (ref 0–4)
YEAST BUDDING URNS QL: PRESENT

## 2024-10-25 PROCEDURE — 6370000000 HC RX 637 (ALT 250 FOR IP): Performed by: NURSE PRACTITIONER

## 2024-10-25 PROCEDURE — 80048 BASIC METABOLIC PNL TOTAL CA: CPT

## 2024-10-25 PROCEDURE — 81001 URINALYSIS AUTO W/SCOPE: CPT

## 2024-10-25 PROCEDURE — 99232 SBSQ HOSP IP/OBS MODERATE 35: CPT | Performed by: INTERNAL MEDICINE

## 2024-10-25 PROCEDURE — 6370000000 HC RX 637 (ALT 250 FOR IP): Performed by: FAMILY MEDICINE

## 2024-10-25 PROCEDURE — 6370000000 HC RX 637 (ALT 250 FOR IP): Performed by: INTERNAL MEDICINE

## 2024-10-25 PROCEDURE — 2580000003 HC RX 258

## 2024-10-25 PROCEDURE — 71045 X-RAY EXAM CHEST 1 VIEW: CPT

## 2024-10-25 PROCEDURE — 6360000002 HC RX W HCPCS: Performed by: PHYSICIAN ASSISTANT

## 2024-10-25 PROCEDURE — 6370000000 HC RX 637 (ALT 250 FOR IP)

## 2024-10-25 PROCEDURE — 2580000003 HC RX 258: Performed by: PHYSICIAN ASSISTANT

## 2024-10-25 PROCEDURE — 85025 COMPLETE CBC W/AUTO DIFF WBC: CPT

## 2024-10-25 RX ORDER — MULTIVITAMIN WITH IRON
1 TABLET ORAL DAILY
Qty: 30 TABLET | Refills: 0 | Status: SHIPPED | OUTPATIENT
Start: 2024-10-26

## 2024-10-25 RX ORDER — POTASSIUM CHLORIDE 1500 MG/1
20 TABLET, EXTENDED RELEASE ORAL 2 TIMES DAILY
Qty: 10 TABLET | Refills: 0 | Status: SHIPPED | OUTPATIENT
Start: 2024-10-25 | End: 2024-10-30

## 2024-10-25 RX ORDER — MIDODRINE HYDROCHLORIDE 10 MG/1
10 TABLET ORAL
Qty: 90 TABLET | Refills: 0 | Status: SHIPPED | OUTPATIENT
Start: 2024-10-25

## 2024-10-25 RX ORDER — GABAPENTIN 100 MG/1
100 CAPSULE ORAL 3 TIMES DAILY
Qty: 90 CAPSULE | Refills: 0 | Status: SHIPPED | OUTPATIENT
Start: 2024-10-25 | End: 2024-11-24

## 2024-10-25 RX ORDER — FOLIC ACID 1 MG/1
1 TABLET ORAL DAILY
Qty: 30 TABLET | Refills: 3 | Status: SHIPPED | OUTPATIENT
Start: 2024-10-26

## 2024-10-25 RX ORDER — SULFAMETHOXAZOLE AND TRIMETHOPRIM 800; 160 MG/1; MG/1
1 TABLET ORAL 2 TIMES DAILY
Qty: 10 TABLET | Refills: 0 | Status: SHIPPED | OUTPATIENT
Start: 2024-10-25 | End: 2024-10-30

## 2024-10-25 RX ORDER — BUPRENORPHINE AND NALOXONE 8; 2 MG/1; MG/1
1 FILM, SOLUBLE BUCCAL; SUBLINGUAL 2 TIMES DAILY
Qty: 14 FILM | Refills: 0 | Status: SHIPPED | OUTPATIENT
Start: 2024-10-25 | End: 2024-11-01

## 2024-10-25 RX ORDER — LANOLIN ALCOHOL/MO/W.PET/CERES
100 CREAM (GRAM) TOPICAL DAILY
Qty: 30 TABLET | Refills: 3 | Status: SHIPPED | OUTPATIENT
Start: 2024-10-26

## 2024-10-25 RX ORDER — FUROSEMIDE 20 MG/1
20 TABLET ORAL DAILY
Qty: 60 TABLET | Refills: 3 | Status: SHIPPED | OUTPATIENT
Start: 2024-10-26

## 2024-10-25 RX ORDER — SPIRONOLACTONE 50 MG/1
50 TABLET, FILM COATED ORAL DAILY
Qty: 30 TABLET | Refills: 3 | Status: SHIPPED | OUTPATIENT
Start: 2024-10-26

## 2024-10-25 RX ADMIN — MIDODRINE HYDROCHLORIDE 10 MG: 5 TABLET ORAL at 11:46

## 2024-10-25 RX ADMIN — OXYCODONE 5 MG: 5 TABLET ORAL at 09:31

## 2024-10-25 RX ADMIN — SPIRONOLACTONE 50 MG: 25 TABLET ORAL at 09:32

## 2024-10-25 RX ADMIN — MIDODRINE HYDROCHLORIDE 10 MG: 5 TABLET ORAL at 09:31

## 2024-10-25 RX ADMIN — POLYETHYLENE GLYCOL 3350 17 G: 17 POWDER, FOR SOLUTION ORAL at 09:38

## 2024-10-25 RX ADMIN — GABAPENTIN 100 MG: 100 CAPSULE ORAL at 13:46

## 2024-10-25 RX ADMIN — OXYCODONE 5 MG: 5 TABLET ORAL at 02:51

## 2024-10-25 RX ADMIN — FOLIC ACID TAB 400 MCG 1 MG: 400 TAB at 09:31

## 2024-10-25 RX ADMIN — OXYCODONE 5 MG: 5 TABLET ORAL at 13:46

## 2024-10-25 RX ADMIN — THERA TABS 1 TABLET: TAB at 09:31

## 2024-10-25 RX ADMIN — SODIUM CHLORIDE, PRESERVATIVE FREE 10 ML: 5 INJECTION INTRAVENOUS at 09:33

## 2024-10-25 RX ADMIN — FUROSEMIDE 20 MG: 20 TABLET ORAL at 09:31

## 2024-10-25 RX ADMIN — GABAPENTIN 100 MG: 100 CAPSULE ORAL at 09:31

## 2024-10-25 RX ADMIN — Medication 100 MG: at 09:31

## 2024-10-25 RX ADMIN — POTASSIUM BICARBONATE 40 MEQ: 782 TABLET, EFFERVESCENT ORAL at 09:31

## 2024-10-25 RX ADMIN — PANTOPRAZOLE SODIUM 40 MG: 40 INJECTION, POWDER, FOR SOLUTION INTRAVENOUS at 09:32

## 2024-10-25 ASSESSMENT — PAIN DESCRIPTION - LOCATION
LOCATION: HEAD;HIP;ABDOMEN
LOCATION: ABDOMEN
LOCATION: ABDOMEN

## 2024-10-25 ASSESSMENT — PAIN DESCRIPTION - DESCRIPTORS
DESCRIPTORS: ACHING;SORE
DESCRIPTORS: ACHING;SORE
DESCRIPTORS: CRAMPING

## 2024-10-25 ASSESSMENT — PAIN SCALES - GENERAL
PAINLEVEL_OUTOF10: 8
PAINLEVEL_OUTOF10: 9
PAINLEVEL_OUTOF10: 8

## 2024-10-25 ASSESSMENT — PAIN DESCRIPTION - ORIENTATION
ORIENTATION: MID
ORIENTATION: ANTERIOR
ORIENTATION: MID

## 2024-10-25 NOTE — PLAN OF CARE
Problem: Discharge Planning  Goal: Discharge to home or other facility with appropriate resources  Outcome: Progressing  Flowsheets (Taken 10/24/2024 2020)  Discharge to home or other facility with appropriate resources: Identify barriers to discharge with patient and caregiver     Problem: Pain  Goal: Verbalizes/displays adequate comfort level or baseline comfort level  Outcome: Progressing     Problem: Safety - Adult  Goal: Free from fall injury  Outcome: Progressing     Problem: Nutrition Deficit:  Goal: Optimize nutritional status  Outcome: Progressing

## 2024-10-25 NOTE — PLAN OF CARE
Problem: Discharge Planning  Goal: Discharge to home or other facility with appropriate resources  10/25/2024 1332 by Sweetie Simon LPN  Outcome: Progressing  Flowsheets (Taken 10/25/2024 0845)  Discharge to home or other facility with appropriate resources: Identify barriers to discharge with patient and caregiver  10/25/2024 0026 by Levy Cleary, RN  Outcome: Progressing  Flowsheets (Taken 10/24/2024 2020)  Discharge to home or other facility with appropriate resources: Identify barriers to discharge with patient and caregiver     Problem: Pain  Goal: Verbalizes/displays adequate comfort level or baseline comfort level  10/25/2024 1332 by Sweetie Simon LPN  Outcome: Progressing  10/25/2024 0026 by Levy Cleary, RN  Outcome: Progressing     Problem: Safety - Adult  Goal: Free from fall injury  10/25/2024 1332 by Sweetie Simon LPN  Outcome: Progressing  10/25/2024 0026 by Levy Cleary, RN  Outcome: Progressing     Problem: Nutrition Deficit:  Goal: Optimize nutritional status  10/25/2024 1332 by Sweetie Simon LPN  Outcome: Progressing  10/25/2024 0026 by Levy Cleary, RN  Outcome: Progressing

## 2024-10-25 NOTE — PROGRESS NOTES
Comprehensive Nutrition Assessment    Type and Reason for Visit: Reassess    Nutrition Recommendations/Plan:   Continue regular, GI bland diet  Ensure Plant Based ONS TID  Obtain updated weight, standing scale if possible  Continue current vitamin/mineral supplementation     Malnutrition Assessment:  Malnutrition Status:  At risk for malnutrition (Comment) (10/22/24 1059)    Context:  Acute Illness     Findings of the 6 clinical characteristics of malnutrition:  Energy Intake:  75% or less of estimated energy requirements for 7 or more days  Weight Loss:  Unable to assess     Body Fat Loss:  Mild body fat loss Buccal region, Orbital   Muscle Mass Loss:  Mild muscle mass loss Temples (temporalis), Clavicles (pectoralis & deltoids)  Fluid Accumulation:  Unable to assess     Strength:  Not Performed     Nutrition Assessment:    40 yo female admitted for jaundice, presented w abd pain, N/V, jaundice, decreased appetite x 1-2 weeks. PMH of alcohol abuse, heroin abuse.    10/25: f/u. Episodes of hematochezia, s/p EGD 10/24, findings were two clean based ulcers and mild portal hypertensive gastropathy. Plan for EGD in a month to monitor ulcer healing.  Quick check in with pt, she is still eating well, intakes documented at 25-50% and %. She is also still drinking at least 1 Ensure Plant Based ONS daily. Discussed following a low sodium diet at home and strategies to adjust her current diet. RD to continue to monitor.     10/22: f/u. Diuretics held d/t low BP, ascites increasing. BP increasing after starting midodrine, MD plan to restart diuretics as BP allows. Per hepatology, no paracentesis needed. Possible dc today if BP strong enough to start diuretics.   Spoke with pt at bedside, reports eating % of meals depending on if she likes the food. She is drinking 1 Ensure Plant Based ONS per day, but would still like all three sent. No updated weight since admission. Mentioned she is going to focus on  \"colorful\" foods and protein at home and try eating more consistently. Pt no longer meets criteria for malnutrition based on improved PO intake, will still place at risk and continue to monitor.     10/18: f/u. HIDA scan appeared to be normal, no need for cholecystectomy, general surgery signed off. GI following, concern for possible hematochezia. S/p EGD 10/16, findings include small varices in esophagus and 2 small gastric ulcers, which were likely to site of bleeding.  Spoke with pt at bedside, she reports really enjoying the Plant Based Ensures, re-ordered and adjusted to TID. She only received 2 earlier in the week and they were not re-ordered after NPO for procedure. Provided coupons as she wants to continue drinking them at home. She has been trying to eat 50% of the food, she does not eat much at baseline and she has been disliking the food, but she understands the importance of eating. Discharge pending stable Hgb and INR. RD to continue to monitor PO and ONS intake during admission.     10/14: RD evaluation for low BMI. Imaging showed hepatomegaly with ascites, gallbladder sludge and wall thickening. Pt with alcohol hepatitis. NPO for HIDA today.   Spoke with pt at bedside, she reports tolerating CLD well and is voicing that she is hungry. PTA she reports not eating anything besides alcohol x 1 week d/t being in pain and N/V. She reports a UBW of 105#, #. She reports weighing 130# about 1.5-2 years ago. Weight loss has been from alcohol consumption and poor PO intake. Unable to find weights in chart besides this admission and OP visit in 2016. She is interested in gaining some of the weight back, she is agreeable to trying Ensure Plant Based ONS BID as milk does not agree with her stomach at times. Discussed some high calorie foods for the pt to try at home. RD to continue to monitor PO/ONS intake and weight.    Nutritionally Significant Medications:  Folic acid, lasix, MVI, protonix,

## 2024-10-25 NOTE — DISCHARGE SUMMARY
Discharge Summary       PATIENT ID: Kristie Rowan  MRN: [id number]   YOB: 1985    DATE OF ADMISSION: 10/11/2024  4:42 PM    DATE OF DISCHARGE: 10/25/24   PRIMARY CARE PROVIDER: No primary care provider on file.     ATTENDING PHYSICIAN: AMIRAH Thornton MD  DISCHARGING PROVIDER: Anu Henriquez PA-C    To contact this individual call [phone] and ask the  to page.  If unavailable ask to be transferred the Adult Hospitalist Department.    CONSULTATIONS: IP CONSULT TO GENERAL SURGERY  IP CONSULT TO GI  IP CONSULT TO HOSPITALIST  IP CONSULT TO SOCIAL WORK  IP CONSULT TO PHARMACY  IP CONSULT TO HEPATOLOGY  IP CONSULT TO GI  IP CONSULT TO ADDICTION MEDICINE    PROCEDURES/SURGERIES: Procedure(s):  ESOPHAGOGASTRODUODENOSCOPY     ADMITTING DIAGNOSES & HOSPITAL COURSE:     Kristie Rowan is a  39 y.o. female with apmhx past heroin abuse, and etoh dependence who presents with abdominal pain, n/v, and jaundice.  She was in her usual state of health until 1.5 weeks ago when she developed nausea, vomiting, abdominal pain and bloating.  She denies fever, chills, diarrhea, or dysuria.  She has a remote hx of IV heroin use, and has been sober for 4 years.  She states she tested negative for hep C 4 years ago.  She does drink 3 glasses of vodka or wine daily.  She denies hx withdrawal.      In the ED, she was tachycardic to 118.  Other VSS.  Lab showed WBC 19.1, macrocytic anemia with Hgb 8.8, Na 128, + 3.1, , Alk phos 599, T. Bili 10.5, and albumin 3.  US abdomen showed hepatomegaly with increased liver echogenicity and ascites, and gallbladder sludge with wall thickening an pericholecystic fluid.  MRCP was ordered, and is pending.     In the ED, she received morphine, toradol, and zofran        10/25  Patient seen and examined this am. States she is tired of being here and is looking forward to going home. Concerned about what she can take for pain outpatient, consulted addiction medicine who sent

## 2024-10-25 NOTE — PROGRESS NOTES
[facility]      Akira Calvo MD, FACP, FACG, FAASLD      BUZZ James, [facility]   Sheeba Obandowilliamonur, [facility]   Sobeida Emeka, [facility]  Gustavo Kurtz, [facility]   Kathi Tillman, [facility]   Elvia Brownon, [facility]      [facility]   [address], [address]   [place]  [address] [phone]   FAX: [phone]  [facility]   [address], [address]   [place]  [address] [phone]   FAX: [phone]       HEPATOLOGY PROGRESS NOTE  The patient is a 39 y.o. female who has consumed 4-5 alcohol drinks per day for many years.    She also has a h/o IVDA but stopped drug use in 2019.  She is currently on suboxone.  She takes a PPI for GERD    She came to the ED because of abdominal pain.      In the ED Laboratory studies were significant for:    Sna 128, Scr 0.68 mg, , ALT 20, , TBILI 10.5 mg, ALB 3.0 gm, WBC 19.1, HB 8.8 gms, , INR 1.2.    Imaging of the liver with Ultrasound and MRI demonstrated hepatic steatosis consistent with alcohol induced hepatitis, nodular surface to the liver suggesting cirrhosis, no significant bile duct dilation, no liver mass, ascites.    Hospital Course to date:  She did not develop withdrawal.    HypoNA resolved with IV fluids.  She did not need IV albumin    TBILI continues to trend down  INR improved with Vitamin K  Steroids not administered.     US shows only minimal, ascites.  No paracentesis needed  Lasix 20 mg dialy/Spironolactone 50 mg daily    Developed melena and drop in HB from 7 to 4.5 gms.  NO hypotension.  She received 2 unit of blood.  EGD by Alyce demonstrated small esophageal varices and 2 small  which were likely the site of bleeding.  Now on a PPI    She required a unit of PRBC on 10/23 and reported small episodes

## 2024-10-25 NOTE — CARE COORDINATION
Transition of Care Plan:    CM reviewed chart and attended rounds. No CM needs identified as of 10/25; addiction medicine consulted then anticipate home.     RUR: 13%  Prior Level of Functioning: independent  Disposition: Home  If SNF or IPR: Date FOC offered:   Date FOC received:   Accepting facility:   Date authorization started with reference number:   Date authorization received and expires:   Follow up appointments: per physician recommendation   DME needed: N/A  Transportation at discharge: family  IM/IMM Medicare/ letter given: N/A  Caregiver Contact: Justin Rowan [phone]  Barriers to discharge: addiction medicine consult

## 2024-10-25 NOTE — CONSULTS
Initial Addiction Medicine Consultation            IDENTIFICATION:    Patient Name  Kristie Rowan   Date of Birth 1985   [facility] [id number]   Medical Record Number  [id number]      Age  39 y.o.   PCP No primary care provider on file.   Admit date:  10/11/2024    Room Number  564/01  @ [facility]   Date of Service  10/25/2024            ASSESSMENT & PLAN        Kristie Rowan, is a 39 y.o.  female with alcohol use disorder and opioid use disorder admitted with complications of alcohol induced cirrhosis of the liver.      As far as her pain and opioid use disorder - stop oxycodone and increase bup/naloxone to 8 mg twice daily to address pain related to abdominal ascites.  Rx sent to her home pharmacy.  She will follow up with her outpatient addiction practice.  From her report - it sounds like she has been successful in outpatient addiction management of her opioid use disorder as far as engagement in treatment and abstinence from illicit opioids.      For alcohol use disorder - she is very motivated to stop drinking.  Discussed relapsing/remitting course of alcohol use disorder and factors that may lead to return to use.  Discussed importance of support network or connection to 12 step programming or other recovery supports.  She is not interested at this time but agrees to keep an open mind if she is not able to achieve her goals for sobriety.      Needs HIV testing.            Darwin Griffith MD [facility]  [facility]  [phone]  Fax [phone]    HISTORY         REASON FOR HOSPITALIZATION:  CC: \"opioid use disorder, alcohol use disorder\".      HISTORY OF PRESENT ILLNESS:    The patient, Kristie Rowan, is a 39 y.o.  female with a history of opioid use disorder and alcohol use disorder who was admitted with abdominal pain related to cirrhosis of the liver.  She has been hospitalized for about 2 weeks.  Her pain has been managed during hospitalization with oxycodone 10  injection 5-40 mL  5-40 mL IntraVENous 2 times per day    0.9 % sodium chloride infusion   IntraVENous PRN    ondansetron (ZOFRAN-ODT) disintegrating tablet 4 mg  4 mg Oral Q8H PRN    Or    ondansetron (ZOFRAN) injection 4 mg  4 mg IntraVENous Q6H PRN    polyethylene glycol (GLYCOLAX) packet 17 g  17 g Oral Daily PRN    acetaminophen (TYLENOL) tablet 650 mg  650 mg Oral Q6H PRN    Or    acetaminophen (TYLENOL) suppository 650 mg  650 mg Rectal Q6H PRN    sodium chloride flush 0.9 % injection 5-40 mL  5-40 mL IntraVENous BID

## 2024-10-25 NOTE — PROGRESS NOTES
Patient received discharged orders. Patient discharged home and transported by father. IV removed.

## 2024-10-25 NOTE — DISCHARGE INSTRUCTIONS
Discharge Instructions       PATIENT ID: Kristie Rowan  MRN: [id number]   YOB: 1985    DATE OF ADMISSION: 10/11/2024   DATE OF DISCHARGE: 10/25/2024    PRIMARY CARE PROVIDER: No primary care provider on file.     ATTENDING PHYSICIAN: Alivia Thornton MD   DISCHARGING PROVIDER: Anu Henriquez PA-C    To contact this individual call [phone] and ask the  to page.   If unavailable ask to be transferred the Adult Hospitalist Department.    DISCHARGE DIAGNOSES     Alcohol Hepatitis with transaminitis  - MRI abd 10/11: Enlarged liver with geographic areas of hepatic steatosis and possible early fibrotic changes. Mild sludge in the gallbladder. No significant gallbladder wall thickening is noted. No biliary dilatation.  - RUQ u/s on 10/14 showed Small volume of ascites.  Paracentesis not performed.  - Hepatology following, appreciate recommendations   - EGD on 10/16 Esophagus: Small varices without stigmata of recent hemorrhage  Stomach: 2 clean-based ulcers with greatest diameter of 15 mm in the pre-pyloric antrum status post biopsies, mild portal hypertensive gastropathy.  Duodenum: normal  - continue PPI bid  - INR trended down 1.4 10/20  - Sodium restricted diet  - Has been unable to tolerate lasix and spironolactone during admission 2/2 hypotension.  sbp in low 100's--> Shiffman recommending midodrine 5mg TID --> then increased to 10mg TID  - Paracentesis ordered -> not preformed due to persistently low ascitic fluid  - increase to step 1/2 diuretics as soon as BP allows   - continue MVI, folate, thiamine     Hypotension  - lasix and spironolactone with midodrine support    Acute cystitis?  - UA with +LE/+Nitrate, WBC 0-4  - will send on abx and follow urine cx   - Ucx pending      Cholecystitis - Ruled out   - evaluated by surgery, HIDA scan normal, no surgery recommended at this time  - 10/22 stop Zosyn and monitor     Anemia, acute blood loss  - s/p EGD by  demonstrated

## 2024-10-28 ENCOUNTER — TELEPHONE (OUTPATIENT)
Age: 39
End: 2024-10-28

## 2024-10-28 ENCOUNTER — CLINICAL DOCUMENTATION (OUTPATIENT)
Age: 39
End: 2024-10-28

## 2024-10-28 DIAGNOSIS — K27.4 PEPTIC ULCER DISEASE WITH HEMORRHAGE: ICD-10-CM

## 2024-10-28 DIAGNOSIS — K70.31 ALCOHOLIC CIRRHOSIS OF LIVER WITH ASCITES (HCC): Primary | ICD-10-CM

## 2024-10-28 NOTE — TELEPHONE ENCOUNTER
----- Message from Dr. Kendy Eldridge MD sent at 10/28/2024  1:01 PM EDT -----  Regarding: RE: Discharge f/u appt  Orders placed.    Lori can you kindly remind patient she needs labs in later this week?  Thank you  ----- Message -----  From: Carmen Mello MD  Sent: 10/26/2024   7:23 AM EDT  To: Kendy Eldridge MD  Subject: Discharge f/u appt                               Should have repeat MELD/cbc labs in 1-2 weeks and clinic f/u in 2-4 weeks.  J          10/28/24@1445 Attempt to call patient. No answer. Left voice message explaining labs in the system to be done this week. Next appt can be 11/14/24 @ 0830, 0930, 1030 or 1130 am. Pending return call.(KF)    10/29/24@0843 Attempt to call again. No answer left voice message. Pending return call. (KF)---2436 Patient return call. Request labs be placed in the mail. Labs in mail. (KF)

## 2024-11-01 LAB
ALBUMIN SERPL-MCNC: 3.3 G/DL (ref 3.9–4.9)
ALP SERPL-CCNC: 377 IU/L (ref 44–121)
ALT SERPL-CCNC: 18 IU/L (ref 0–32)
AST SERPL-CCNC: 138 IU/L (ref 0–40)
BILIRUB DIRECT SERPL-MCNC: 4.32 MG/DL (ref 0–0.4)
BILIRUB SERPL-MCNC: 6.1 MG/DL (ref 0–1.2)
BUN SERPL-MCNC: 13 MG/DL (ref 6–24)
BUN/CREAT SERPL: 13 (ref 9–23)
CALCIUM SERPL-MCNC: 9.1 MG/DL (ref 8.7–10.2)
CHLORIDE SERPL-SCNC: 98 MMOL/L (ref 96–106)
CO2 SERPL-SCNC: 21 MMOL/L (ref 20–29)
CREAT SERPL-MCNC: 0.97 MG/DL (ref 0.57–1)
EGFRCR SERPLBLD CKD-EPI 2021: 76 ML/MIN/1.73
ERYTHROCYTE [DISTWIDTH] IN BLOOD BY AUTOMATED COUNT: 19.4 % (ref 11.7–15.4)
GLUCOSE SERPL-MCNC: 60 MG/DL (ref 70–99)
HCT VFR BLD AUTO: 27.2 % (ref 34–46.6)
HGB BLD-MCNC: 8.9 G/DL (ref 11.1–15.9)
INR PPP: 1.2 (ref 0.9–1.2)
MCH RBC QN AUTO: 33.6 PG (ref 26.6–33)
MCHC RBC AUTO-ENTMCNC: 32.7 G/DL (ref 31.5–35.7)
MCV RBC AUTO: 103 FL (ref 79–97)
PLATELET # BLD AUTO: 360 X10E3/UL (ref 150–450)
POTASSIUM SERPL-SCNC: 4.6 MMOL/L (ref 3.5–5.2)
PROT SERPL-MCNC: 7.2 G/DL (ref 6–8.5)
PROTHROMBIN TIME: 13 SEC (ref 9.1–12)
RBC # BLD AUTO: 2.65 X10E6/UL (ref 3.77–5.28)
SODIUM SERPL-SCNC: 137 MMOL/L (ref 134–144)
WBC # BLD AUTO: 12.7 X10E3/UL (ref 3.4–10.8)

## 2024-11-01 NOTE — RESULT ENCOUNTER NOTE
Hepatic panel reviewed. Alk phos and AST rising. Bilirubin elevated but stable since discharge. In regards to her alk phos and AST, she had MRCP and abdominal US during recent hospitalization for same issue and there was not biliary duct dilation.     Lets set up a follow up with me in 1-2 weeks at which time we will repeat labs including a PETH and serologies of chronic liver disease.     Lori can you help arrange this follow up?

## 2024-11-01 NOTE — RESULT ENCOUNTER NOTE
Hemoglobin stable- this is great news. Will await hepatic function panel results. No action currently needed.

## (undated) DEVICE — ORISE PROKNIFE 1.5 MM ELECTRODE: Brand: ORISE™ PROKNIFE

## (undated) DEVICE — FORCEPS BX L240CM JAW DIA2.4MM WRK CHN 2.8MM ORNG L CAP W/

## (undated) DEVICE — ORISE PROKNIFE 3.0 MM ELECTRODE: Brand: ORISE™ PROKNIFE

## (undated) DEVICE — FORCEPS BX L240CM JAW DIA2.8MM L CAP W/ NDL MIC MESH TOOTH